# Patient Record
Sex: FEMALE | Race: WHITE | NOT HISPANIC OR LATINO | Employment: OTHER | URBAN - METROPOLITAN AREA
[De-identification: names, ages, dates, MRNs, and addresses within clinical notes are randomized per-mention and may not be internally consistent; named-entity substitution may affect disease eponyms.]

---

## 2017-01-09 RX ORDER — SALSALATE 500 MG/1
TABLET, FILM COATED ORAL
Qty: 60 TABLET | Refills: 0 | Status: SHIPPED | OUTPATIENT
Start: 2017-01-09 | End: 2017-03-01 | Stop reason: SDUPTHER

## 2017-01-16 RX ORDER — METFORMIN HYDROCHLORIDE 1000 MG/1
1000 TABLET ORAL 2 TIMES DAILY WITH MEALS
Qty: 120 TABLET | Refills: 6 | Status: SHIPPED | OUTPATIENT
Start: 2017-01-16 | End: 2018-06-26

## 2017-01-20 RX ORDER — METFORMIN HYDROCHLORIDE 1000 MG/1
1000 TABLET ORAL 2 TIMES DAILY WITH MEALS
Qty: 120 TABLET | Refills: 6 | Status: CANCELLED | OUTPATIENT
Start: 2017-01-20

## 2017-02-02 DIAGNOSIS — E11.40 TYPE 2 DIABETES MELLITUS WITH DIABETIC NEUROPATHY, WITH LONG-TERM CURRENT USE OF INSULIN: ICD-10-CM

## 2017-02-02 DIAGNOSIS — Z79.4 TYPE 2 DIABETES MELLITUS WITH DIABETIC NEUROPATHY, WITH LONG-TERM CURRENT USE OF INSULIN: ICD-10-CM

## 2017-02-02 RX ORDER — INSULIN ASPART 100 [IU]/ML
INJECTION, SUSPENSION SUBCUTANEOUS
Qty: 30 ML | Refills: 0 | Status: SHIPPED | OUTPATIENT
Start: 2017-02-02 | End: 2017-03-09 | Stop reason: SDUPTHER

## 2017-03-01 RX ORDER — SALSALATE 500 MG/1
TABLET, FILM COATED ORAL
Qty: 60 TABLET | Refills: 0 | Status: SHIPPED | OUTPATIENT
Start: 2017-03-01 | End: 2017-04-05 | Stop reason: SDUPTHER

## 2017-03-09 DIAGNOSIS — Z79.4 TYPE 2 DIABETES MELLITUS WITH DIABETIC NEUROPATHY, WITH LONG-TERM CURRENT USE OF INSULIN: ICD-10-CM

## 2017-03-09 DIAGNOSIS — E11.40 TYPE 2 DIABETES MELLITUS WITH DIABETIC NEUROPATHY, WITH LONG-TERM CURRENT USE OF INSULIN: ICD-10-CM

## 2017-03-09 RX ORDER — INSULIN ASPART 100 [IU]/ML
INJECTION, SUSPENSION SUBCUTANEOUS
Qty: 30 ML | Refills: 3 | Status: SHIPPED | OUTPATIENT
Start: 2017-03-09 | End: 2017-12-21 | Stop reason: SDUPTHER

## 2017-03-13 ENCOUNTER — LAB VISIT (OUTPATIENT)
Dept: LAB | Facility: HOSPITAL | Age: 67
End: 2017-03-13
Attending: FAMILY MEDICINE
Payer: MEDICARE

## 2017-03-13 ENCOUNTER — OFFICE VISIT (OUTPATIENT)
Dept: FAMILY MEDICINE | Facility: CLINIC | Age: 67
End: 2017-03-13
Payer: COMMERCIAL

## 2017-03-13 VITALS
HEART RATE: 80 BPM | WEIGHT: 203.06 LBS | SYSTOLIC BLOOD PRESSURE: 114 MMHG | HEIGHT: 68 IN | TEMPERATURE: 98 F | DIASTOLIC BLOOD PRESSURE: 64 MMHG | BODY MASS INDEX: 30.78 KG/M2

## 2017-03-13 DIAGNOSIS — R41.3 MEMORY LOSS: ICD-10-CM

## 2017-03-13 DIAGNOSIS — E11.9 TYPE 2 DIABETES MELLITUS WITHOUT COMPLICATION, WITH LONG-TERM CURRENT USE OF INSULIN: Primary | ICD-10-CM

## 2017-03-13 DIAGNOSIS — Z79.4 TYPE 2 DIABETES MELLITUS WITHOUT COMPLICATION, WITH LONG-TERM CURRENT USE OF INSULIN: Primary | ICD-10-CM

## 2017-03-13 DIAGNOSIS — E11.9 TYPE 2 DIABETES MELLITUS WITHOUT COMPLICATION, WITH LONG-TERM CURRENT USE OF INSULIN: ICD-10-CM

## 2017-03-13 DIAGNOSIS — E11.9 TYPE 2 DIABETES MELLITUS WITHOUT COMPLICATION: ICD-10-CM

## 2017-03-13 DIAGNOSIS — Z79.4 TYPE 2 DIABETES MELLITUS WITHOUT COMPLICATION, WITH LONG-TERM CURRENT USE OF INSULIN: ICD-10-CM

## 2017-03-13 LAB
ALBUMIN SERPL BCP-MCNC: 3.6 G/DL
ALP SERPL-CCNC: 61 U/L
ALT SERPL W/O P-5'-P-CCNC: 7 U/L
ANION GAP SERPL CALC-SCNC: 9 MMOL/L
AST SERPL-CCNC: 14 U/L
BILIRUB SERPL-MCNC: 0.5 MG/DL
BUN SERPL-MCNC: 11 MG/DL
CALCIUM SERPL-MCNC: 9.3 MG/DL
CHLORIDE SERPL-SCNC: 105 MMOL/L
CO2 SERPL-SCNC: 28 MMOL/L
CREAT SERPL-MCNC: 1 MG/DL
EST. GFR  (AFRICAN AMERICAN): >60 ML/MIN/1.73 M^2
EST. GFR  (NON AFRICAN AMERICAN): 58 ML/MIN/1.73 M^2
GLUCOSE SERPL-MCNC: 66 MG/DL
POTASSIUM SERPL-SCNC: 3.9 MMOL/L
PROT SERPL-MCNC: 7 G/DL
SODIUM SERPL-SCNC: 142 MMOL/L
VIT B12 SERPL-MCNC: 277 PG/ML

## 2017-03-13 PROCEDURE — 82607 VITAMIN B-12: CPT

## 2017-03-13 PROCEDURE — 36415 COLL VENOUS BLD VENIPUNCTURE: CPT | Mod: PO

## 2017-03-13 PROCEDURE — 99999 PR PBB SHADOW E&M-EST. PATIENT-LVL III: CPT | Mod: PBBFAC,,, | Performed by: FAMILY MEDICINE

## 2017-03-13 PROCEDURE — 99214 OFFICE O/P EST MOD 30 MIN: CPT | Mod: S$GLB,,, | Performed by: FAMILY MEDICINE

## 2017-03-13 PROCEDURE — 83036 HEMOGLOBIN GLYCOSYLATED A1C: CPT

## 2017-03-13 PROCEDURE — 80053 COMPREHEN METABOLIC PANEL: CPT

## 2017-03-13 RX ORDER — VALACYCLOVIR HYDROCHLORIDE 1 G/1
TABLET, FILM COATED ORAL
Refills: 0 | COMMUNITY
Start: 2017-02-24 | End: 2018-10-30

## 2017-03-13 RX ORDER — TRAMADOL HYDROCHLORIDE 50 MG/1
TABLET ORAL
Refills: 0 | COMMUNITY
Start: 2017-02-24 | End: 2018-10-30

## 2017-03-13 RX ORDER — AMOXICILLIN AND CLAVULANATE POTASSIUM 875; 125 MG/1; MG/1
TABLET, FILM COATED ORAL
Refills: 0 | COMMUNITY
Start: 2017-02-24 | End: 2018-10-30 | Stop reason: ALTCHOICE

## 2017-03-13 RX ORDER — ATENOLOL 100 MG/1
TABLET ORAL
Refills: 4 | COMMUNITY
Start: 2016-12-11 | End: 2017-05-02 | Stop reason: SDUPTHER

## 2017-03-13 NOTE — PROGRESS NOTES
Subjective:       Patient ID: Gris Ken is a 67 y.o. female.    Chief Complaint: Herpes Zoster (follow up)    HPI Comments: Disclaimer: This note has been generated using voice-recognition software. There may be typographical errors that have been missed during proof-reading    Patient is 67-year-old presents with her son today for follow-up.  According to her son, who is present today, the patient was diagnosed as having herpes zoster outbreak involving the left facial area.  The initial diagnosis was done in an urgent care department approximately 10 days ago.  She has now finished a course of Valtrex.  She denies any persistent pain although she has noted some occasional pruritus.  No difficulty swallowing, or mouth pain.  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  She has not been seen for follow-up in over one year.  Her son states that blood glucose monitoring at home has been in the 120s to 150s although she has had several episodes of hypoglycemia with blood sugars in the 50s.  Patient has a pending follow-up appointment with the neurology department for prior history of memory loss and expressive aphasia. She underwent a driving evaluation in August of last year, she is able to drive on a limited basis.  Patient had low vitamin B12 levels in the past, but is presently on no replacement.  Pt underwent Neuropsychological evaluation about one year ago:  RECOMMENDATIONS:     1. More sophisticated neuroimaging, such as a PET scan, may be beneficial in further clarifying the etiology of Ms. Ken decline. 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      2. It is encouraging that there have no observed changes in Ms. Ken daily functioning. However, it will be important to continually monitor her daily functioning as PNFA is a condition that is expected to  progressively worsen over time. In fact, the present findings suggest that she is already experiencing cognitive decline beyond language difficulties. Specifically, testing revealed reduced processing speed, complex/divided attention, and reasoning skills. Based on these findings, it will be very important to monitor her medication compliance and financial management as these are the 2 most complex tasks she is asked to complete on a consistent basis. A higher level of care may be warranted in the future.      3. There are several necessary skills for safe driving, including intact processing speed and complex/divided attention. As noted, Ms. Ken test scores revealed moderate weaknesses in both of these cognitive domains. As a result, it is recommended that she undergo a formal, on-the-road driving evaluation. This type of evaluation can be conducted at the [facility] with [facility]. I will place a referral if Ms. Ken agrees to undergo the evaluation. Once the referral is placed, an appointment can be scheduled with Deanna Quarles at [phone].     4. Ms. Ken may benefit from speech therapy. However, it will be important that the therapist be aware of a condition such as PPA. This is a unique language disorder that progressively worsens over time. As a result, it will be most beneficial for Ms. Ken to work on develop compensatory mechanisms for communication.      5. I agree with Leonor Quach NP that a referral to Dr. Pang for a specialized movement disorder examination may be beneficial considering the suspected etiology of her symptoms.      6. A re-evaluation in 12-18 months is recommended to assess for interval change in order to determine appropriate level of care.     Review of Systems   Endocrine: Negative for polydipsia, polyphagia and polyuria.   Skin: Negative for color change and rash.   Neurological: Negative for facial asymmetry, weakness and numbness.       Objective:       Physical Exam   Constitutional: She appears well-developed and well-nourished. No distress.   Neck: No thyromegaly present.   Cardiovascular: Normal rate and regular rhythm.    No murmur heard.  Pulmonary/Chest: Effort normal and breath sounds normal. She has no wheezes. She has no rales.   Lymphadenopathy:     She has no cervical adenopathy.   Skin: No rash noted.   Left facial exam shows complete resolution of lesions, no vesicles noted       Assessment:         1.  Herpes zoster  2.  Type II DM  3.  Memory loss, progressive aphasia  4.  Vitamin B12 def  Plan:       1.  CMP, A1C, B12 levels  2.  Continue present medications  3.  F/u 3 months   4.  Reassurance re H. Zoster

## 2017-03-13 NOTE — MR AVS SNAPSHOT
[facility]  [address] [facility], [address]  [facility] [address]  Phone: [phone]  Fax: [phone]                  Gris Ken   3/13/2017 2:20 PM   Office Visit    Description:  Female : 1950   Provider:  Israel Welsh MD   Department:  [facility]           Reason for Visit     Herpes Zoster           Diagnoses this Visit        Comments    Type 2 diabetes mellitus without complication, with long-term current use of insulin    -  Primary     Memory loss                To Do List           Goals (5 Years of Data)              3/30/16    2/2/16    1/21/16    HEMOGLOBIN A1C < 7.0   7.1  6.8  6.9      Follow-Up and Disposition     Return in about 3 months (around 2017).      [facility]     [facility] - / Assistance  Registered nurses in the [facility] provide clinical advisement, health education, appointment booking, and other advisory services.  Call for this free service at [phone].             Medications           Message regarding Medications     Verify the changes and/or additions to your medication regime listed below are the same as discussed with your clinician today.  If any of these changes or additions are incorrect, please notify your healthcare provider.             Verify that the below list of medications is an accurate representation of the medications you are currently taking.  If none reported, the list may be blank. If incorrect, please contact your healthcare provider. Carry this list with you in case of emergency.           Current Medications     amoxicillin-clavulanate 875-125mg (AUGMENTIN) 875-125 mg per tablet TK 1 T PO  Q 12 H FOR 10 DAYS    atenolol (TENORMIN) 100 MG tablet     atorvastatin (LIPITOR) 40 MG tablet TAKE 1 TABLET BY MOUTH EVERY EVENING    blood sugar diagnostic (CONTOUR NEXT STRIPS) Strp Monitors blood sugar 4 times daily, before meals and bedtime     "escitalopram oxalate (LEXAPRO) 20 MG tablet One tablet daily    insulin aspart protamine-insulin aspart (NOVOLOG MIX 70-30 FLEXPEN) 100 unit/mL (70-30) InPn pen INJECT 36 UNITS UNDER THE SKIN WITH BREAKFAST AND INJECT 32 UNITS UNDER THE SKIN WITH DINNER    lancets Misc 1 each by Misc.(Non-Drug; Combo Route) route 6 (six) times daily.    memantine (NAMENDA XR) 28 mg CSpX Take 1 capsule by mouth once daily.    metformin (GLUCOPHAGE) 1000 MG tablet Take 1 tablet (1,000 mg total) by mouth 2 (two) times daily with meals.    salsalate (DISALCID) 500 MG Tab TAKE 1 TABLET BY MOUTH TWICE DAILY    tramadol (ULTRAM) 50 mg tablet TK 1 T PO Q 4 H FOR 2 DAYS    valacyclovir (VALTREX) 1000 MG tablet TK 1 T PO Q 8 H FOR 7 DAYS    pen needle, diabetic (SURE-FINE PEN NEEDLES) 31 gauge x 5/16" Ndle 1 each by Misc.(Non-Drug; Combo Route) route 2 (two) times daily.           Clinical Reference Information           Your Vitals Were     BP Pulse Temp Height Weight BMI    114/64 (BP Location: Right arm, Patient Position: Sitting, BP Method: Manual) 80 98.2 °F (36.8 °C) (Oral) 5' 8" (1.727 m) 92.1 kg (203 lb 0.7 oz) 30.87 kg/m2      Blood Pressure          Most Recent Value    BP  114/64      Allergies as of 3/13/2017     Codeine    Percocet [Oxycodone-acetaminophen]    Quinine (Bulk)    Iodinated Contrast Media - Iv Dye    Iodine    Oxycodone    Quinine      Immunizations Administered on Date of Encounter - 3/13/2017     None      Orders Placed During Today's Visit     Future Labs/Procedures Expected by Expires    Comprehensive metabolic panel  3/13/2017 6/11/2017    Hemoglobin A1c  3/13/2017 6/11/2017    Vitamin B12  3/13/2017 6/11/2017      Language Assistance Services     ATTENTION: Language assistance services are available, free of charge. Please call [phone].      ATENCIÓN: Si habla easton, tiene a tan disposición servicios gratuitos de asistencia lingüística. Llame al [phone].     CHÚ Ý: N?u b?n nói Ti?ng Vi?t, có các " d?ch v? h? tr? ngôn ng? mi?n phí dành cho b?n. G?i s? [phone].         [facility] complies with applicable Federal civil rights laws and does not discriminate on the basis of race, color, national origin, age, disability, or sex.

## 2017-03-14 LAB
ESTIMATED AVG GLUCOSE: 131 MG/DL
ESTIMATED AVG GLUCOSE: 131 MG/DL
HBA1C MFR BLD HPLC: 6.2 %
HBA1C MFR BLD HPLC: 6.2 %

## 2017-04-05 RX ORDER — SALSALATE 500 MG/1
TABLET, FILM COATED ORAL
Qty: 60 TABLET | Refills: 0 | Status: SHIPPED | OUTPATIENT
Start: 2017-04-05 | End: 2018-10-30

## 2017-04-07 DIAGNOSIS — E11.9 TYPE 2 DIABETES MELLITUS WITHOUT COMPLICATION: ICD-10-CM

## 2017-04-17 ENCOUNTER — OFFICE VISIT (OUTPATIENT)
Dept: OPTOMETRY | Facility: CLINIC | Age: 67
End: 2017-04-17
Payer: COMMERCIAL

## 2017-04-17 DIAGNOSIS — E10.3293 MILD NONPROLIFERATIVE DIABETIC RETINOPATHY OF BOTH EYES WITHOUT MACULAR EDEMA ASSOCIATED WITH TYPE 1 DIABETES MELLITUS: Primary | ICD-10-CM

## 2017-04-17 DIAGNOSIS — H35.033 HYPERTENSIVE RETINOPATHY OF BOTH EYES: ICD-10-CM

## 2017-04-17 DIAGNOSIS — H43.813 POSTERIOR VITREOUS DETACHMENT, BILATERAL: ICD-10-CM

## 2017-04-17 PROCEDURE — 99999 PR PBB SHADOW E&M-EST. PATIENT-LVL II: CPT | Mod: PBBFAC,,, | Performed by: OPTOMETRIST

## 2017-04-17 PROCEDURE — 92014 COMPRE OPH EXAM EST PT 1/>: CPT | Mod: S$GLB,,, | Performed by: OPTOMETRIST

## 2017-04-17 NOTE — PROGRESS NOTES
HPI     Diabetic eye exam  Some floaters OU, worse in right eye x years  Does not have glasses         Last edited by Ruy May, OD on 4/17/2017 10:23 AM.     ROS     Negative for: Constitutional, Gastrointestinal, Neurological, Skin,   Genitourinary, Musculoskeletal, HENT, Endocrine, Cardiovascular, Eyes,   Respiratory, Psychiatric, Allergic/Imm, Heme/Lymph    Last edited by Ruy May, OD on 4/17/2017  9:43 AM. (History)        Assessment /Plan     For exam results, see Encounter Report.    Mild nonproliferative diabetic retinopathy of both eyes without macular edema associated with type 1 diabetes mellitus    Posterior vitreous detachment, bilateral    Hypertensive retinopathy of both eyes      1. Mild diabetic retinopathy, no csme. Return in 1 year for dilated eye exam.  2. Causing floaters. Monitor condition. Patient to report any changes. RTC 1 year recheck.  3. Mild. Monitor condition. Patient to report any changes. RTC 1 year recheck.

## 2017-05-02 RX ORDER — ATENOLOL 100 MG/1
100 TABLET ORAL DAILY
Qty: 30 TABLET | Refills: 5 | Status: SHIPPED | OUTPATIENT
Start: 2017-05-02 | End: 2018-10-30

## 2017-07-31 DIAGNOSIS — R41.3 MEMORY LOSS: ICD-10-CM

## 2017-07-31 RX ORDER — ESCITALOPRAM OXALATE 20 MG/1
TABLET ORAL
Qty: 90 TABLET | Refills: 1 | Status: SHIPPED | OUTPATIENT
Start: 2017-07-31 | End: 2018-10-30

## 2017-08-21 ENCOUNTER — TELEPHONE (OUTPATIENT)
Dept: FAMILY MEDICINE | Facility: CLINIC | Age: 67
End: 2017-08-21

## 2017-08-21 NOTE — TELEPHONE ENCOUNTER
Please call pt and see if she wants to change beta blocker or try [facility] or smaller pharmacy that has in stock

## 2017-08-21 NOTE — TELEPHONE ENCOUNTER
----- Message from Adriana Gutierrez sent at 8/21/2017  9:01 AM CDT -----  Rickey from [facility] called to advise that the medication atenolol (TENORMIN) 100 MG tablet is on back order, and [facility] will not get it in for at least a month.  Pharmacist wants to know if we can switch to a different beta blocker.  Please call Rickey @ [phone]

## 2017-08-21 NOTE — TELEPHONE ENCOUNTER
Spoke with [facility] on [facility] and they do have Atenolol 100 mg in stock.  Voice msg left for pt regarding back order for this medication at [facility].  Please call our office to advise is a one month refill should be sent to [facility].

## 2017-08-23 ENCOUNTER — PATIENT MESSAGE (OUTPATIENT)
Dept: FAMILY MEDICINE | Facility: CLINIC | Age: 67
End: 2017-08-23

## 2017-12-21 DIAGNOSIS — Z79.4 TYPE 2 DIABETES MELLITUS WITH DIABETIC NEUROPATHY, WITH LONG-TERM CURRENT USE OF INSULIN: ICD-10-CM

## 2017-12-21 DIAGNOSIS — E11.40 TYPE 2 DIABETES MELLITUS WITH DIABETIC NEUROPATHY, WITH LONG-TERM CURRENT USE OF INSULIN: ICD-10-CM

## 2017-12-21 RX ORDER — INSULIN ASPART 100 [IU]/ML
INJECTION, SUSPENSION SUBCUTANEOUS
Qty: 30 ML | Refills: 3 | Status: SHIPPED | OUTPATIENT
Start: 2017-12-21 | End: 2018-10-30

## 2017-12-21 NOTE — TELEPHONE ENCOUNTER
----- Message from Pat Antunez sent at 12/21/2017 12:40 PM CST -----  Contact: pt luis [phone]  RX request - refill or new RX.  Is this a refill or new RX:  refill  RX name and strength:  insulin aspart protamine-insulin aspart (NOVOLOG MIX 70-30 FLEXPEN) 100 unit/mL (70-30) InPn pen   Directions:   Is this a 30 day or 90 day RX:    Pharmacy name and phone # [facility] [id number] [phone]  Comments: need authorization and Pt is out at this time,pt son is concern it has been a few days now

## 2017-12-21 NOTE — TELEPHONE ENCOUNTER
Spoke with patient's son, Dariel, and advised that rx has been electronically sent to [facility].

## 2018-01-03 ENCOUNTER — HOSPITAL ENCOUNTER (EMERGENCY)
Facility: HOSPITAL | Age: 68
Discharge: HOME | End: 2018-01-03
Attending: EMERGENCY MEDICINE
Payer: MEDICARE

## 2018-01-03 VITALS
DIASTOLIC BLOOD PRESSURE: 64 MMHG | SYSTOLIC BLOOD PRESSURE: 115 MMHG | BODY MASS INDEX: 30.87 KG/M2 | WEIGHT: 203 LBS | RESPIRATION RATE: 18 BRPM | OXYGEN SATURATION: 96 % | HEART RATE: 86 BPM | TEMPERATURE: 99 F

## 2018-01-03 DIAGNOSIS — R05.9 COUGH: ICD-10-CM

## 2018-01-03 DIAGNOSIS — R41.82 ALTERED MENTAL STATUS: ICD-10-CM

## 2018-01-03 DIAGNOSIS — E11.9 DM2 (DIABETES MELLITUS, TYPE 2): ICD-10-CM
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
## 2018-01-03 LAB
ALBUMIN SERPL BCP-MCNC: 3 G/DL
ALP SERPL-CCNC: 58 U/L
ALT SERPL W/O P-5'-P-CCNC: 7 U/L
ANION GAP SERPL CALC-SCNC: 8 MMOL/L
AST SERPL-CCNC: 16 U/L
BACTERIA #/AREA URNS AUTO: ABNORMAL /HPF
BASOPHILS # BLD AUTO: 0.02 K/UL
BASOPHILS NFR BLD: 0.5 %
BILIRUB SERPL-MCNC: 0.8 MG/DL
BILIRUB UR QL STRIP: NEGATIVE
BUN SERPL-MCNC: 11 MG/DL
CALCIUM SERPL-MCNC: 8.7 MG/DL
CHLORIDE SERPL-SCNC: 103 MMOL/L
CLARITY UR REFRACT.AUTO: ABNORMAL
CO2 SERPL-SCNC: 24 MMOL/L
COLOR UR AUTO: YELLOW
CREAT SERPL-MCNC: 1.1 MG/DL
DIFFERENTIAL METHOD: ABNORMAL
EOSINOPHIL # BLD AUTO: 0 K/UL
EOSINOPHIL NFR BLD: 0.7 %
ERYTHROCYTE [DISTWIDTH] IN BLOOD BY AUTOMATED COUNT: 12.9 %
EST. GFR  (AFRICAN AMERICAN): 59.6 ML/MIN/1.73 M^2
EST. GFR  (NON AFRICAN AMERICAN): 51.7 ML/MIN/1.73 M^2
GLUCOSE SERPL-MCNC: 100 MG/DL
GLUCOSE UR QL STRIP: ABNORMAL
HCT VFR BLD AUTO: 39.8 %
HGB BLD-MCNC: 13.6 G/DL
HGB UR QL STRIP: ABNORMAL
IMM GRANULOCYTES # BLD AUTO: 0.01 K/UL
IMM GRANULOCYTES NFR BLD AUTO: 0.2 %
KETONES UR QL STRIP: NEGATIVE
LEUKOCYTE ESTERASE UR QL STRIP: ABNORMAL
LYMPHOCYTES # BLD AUTO: 0.7 K/UL
LYMPHOCYTES NFR BLD: 16.2 %
MCH RBC QN AUTO: 28.8 PG
MCHC RBC AUTO-ENTMCNC: 34.2 G/DL
MCV RBC AUTO: 84 FL
MICROSCOPIC COMMENT: ABNORMAL
MONOCYTES # BLD AUTO: 0.5 K/UL
MONOCYTES NFR BLD: 11.7 %
NEUTROPHILS # BLD AUTO: 3 K/UL
NEUTROPHILS NFR BLD: 70.7 %
NITRITE UR QL STRIP: NEGATIVE
NRBC BLD-RTO: 0 /100 WBC
PH UR STRIP: 5 [PH] (ref 5–8)
PLATELET # BLD AUTO: 176 K/UL
PMV BLD AUTO: 9.4 FL
POCT GLUCOSE: 93 MG/DL (ref 70–110)
POTASSIUM SERPL-SCNC: 3.6 MMOL/L
PROT SERPL-MCNC: 6.5 G/DL
PROT UR QL STRIP: NEGATIVE
RBC # BLD AUTO: 4.72 M/UL
RBC #/AREA URNS AUTO: 7 /HPF (ref 0–4)
SODIUM SERPL-SCNC: 135 MMOL/L
SP GR UR STRIP: 1.02 (ref 1–1.03)
SQUAMOUS #/AREA URNS AUTO: 3 /HPF
URN SPEC COLLECT METH UR: ABNORMAL
UROBILINOGEN UR STRIP-ACNC: NEGATIVE EU/DL
WBC # BLD AUTO: 4.19 K/UL
WBC #/AREA URNS AUTO: 13 /HPF (ref 0–5)

## 2018-01-03 PROCEDURE — 82962 GLUCOSE BLOOD TEST: CPT

## 2018-01-03 PROCEDURE — 80053 COMPREHEN METABOLIC PANEL: CPT

## 2018-01-03 PROCEDURE — 99284 EMERGENCY DEPT VISIT MOD MDM: CPT | Mod: ,,, | Performed by: EMERGENCY MEDICINE

## 2018-01-03 PROCEDURE — 85025 COMPLETE CBC W/AUTO DIFF WBC: CPT

## 2018-01-03 PROCEDURE — 99285 EMERGENCY DEPT VISIT HI MDM: CPT | Mod: 25

## 2018-01-03 PROCEDURE — 81001 URINALYSIS AUTO W/SCOPE: CPT

## 2018-01-03 RX ORDER — OSELTAMIVIR PHOSPHATE 75 MG/1
75 CAPSULE ORAL 2 TIMES DAILY
Qty: 10 CAPSULE | Refills: 0 | Status: SHIPPED | OUTPATIENT
Start: 2018-01-03 | End: 2018-01-08

## 2018-01-03 NOTE — ED TRIAGE NOTES
Gris Ken, a 67 y.o. female presents to the ED with altered mental status. Son reports that pt fell several times today and had a hypoglycemic episode yesterday. Son reports pt is usually Aox3 and is now Aox1. Denies and LOC with falls.     Chief Complaint   Patient presents with    Altered Mental Status     Hx of dementia. AOx1, usual AOx3     Review of patient's allergies indicates:   Allergen Reactions    Codeine      Other reaction(s): Vomiting,Tachyc  vomitting and tachycardia    Percocet [oxycodone-acetaminophen]      Other reaction(s): Hallucinations  hallucinations    Quinine (bulk)     Iodinated contrast- oral and iv dye Swelling     Other reaction(s): Hypotension    Iodine      Other reaction(s): Swelling,Hypote    Oxycodone      Other reaction(s): Hallucinations    Quinine      Other reaction(s): Muscle pain     Past Medical History:   Diagnosis Date    Anxiety     Diabetes mellitus     Diabetes mellitus type II     Diabetic retinopathy     DJD (degenerative joint disease)     Fatty liver     Glaucoma suspect with open angle     Hyperlipidemia     Hypertension

## 2018-01-03 NOTE — ED PROVIDER NOTES
Encounter Date: 1/3/2018    SCRIBE #1 NOTE: I, Kasey Doty, am scribing for, and in the presence of,  Dr. Restrepo. I have scribed the following portions of the note - Other sections scribed: HPI,ROS,Physical Exam.       History     Chief Complaint   Patient presents with    Altered Mental Status     Hx of dementia. AOx1, usual AOx3     Time patient was seen by the provider: 4:50 AM      The patient is a 67 y.o. female with hx of: HTN, HLD, DM II and pre-frontal lobe dementia who presents to the ED with a complaint of AMS. Pt is s/p two falls this week. During the first fall pt fell backwards but did not hit her head. Pt's son believes this fall to be due to a hypoglycemic attack in which her blood sugar was around 30 because pt was administering too much of her novolog due to her dementia. The second fall was witnessed and pt got up out of bed too fast and slipped but caught herself and did not hit her head. Today pt is exhibiting a normal blood sugar around the 130's-170's but has increased confusion and was not able to give her name. Now in ED she is able to answer questions like what's her name and where she is. Pt's son also endorses cough and runny nose. The pt's  is also experiencing cold-like symptoms. Yesterday the pt was at her baseline and able to hold a conversation although she did not eat anything. Last shot of insulin was at 7:00 PM last night.      The history is provided by a relative and medical records.     Review of patient's allergies indicates:   Allergen Reactions    Codeine      Other reaction(s): Vomiting,Tachyc  vomitting and tachycardia    Percocet [oxycodone-acetaminophen]      Other reaction(s): Hallucinations  hallucinations    Quinine (bulk)     Iodinated contrast- oral and iv dye Swelling     Other reaction(s): Hypotension    Iodine      Other reaction(s): Swelling,Hypote    Oxycodone      Other reaction(s): Hallucinations    Quinine      Other reaction(s): Muscle pain      Past Medical History:   Diagnosis Date    Anxiety     Diabetes mellitus     Diabetes mellitus type II     Diabetic retinopathy     DJD (degenerative joint disease)     Fatty liver     Glaucoma suspect with open angle     Hyperlipidemia     Hypertension      Past Surgical History:   Procedure Laterality Date    CATARACT EXTRACTION      CATARACT EXTRACTION BILATERAL W/ ANTERIOR VITRECTOMY      CHOLECYSTECTOMY      COLONOSCOPY  2010     Family History   Problem Relation Age of Onset    Cancer Mother      colon    Diabetes Father     Diabetes Sister     Cancer Brother      esophagus     Social History   Substance Use Topics    Smoking status: Former Smoker     Types: Cigarettes    Smokeless tobacco: Never Used    Alcohol use No     Review of Systems   Constitutional: Negative for chills and fever.   HENT: Positive for rhinorrhea.    Eyes: Negative for pain.   Respiratory: Positive for cough.    Cardiovascular: Negative for chest pain.   Gastrointestinal: Negative for nausea and vomiting.   Genitourinary: Negative for difficulty urinating.   Musculoskeletal: Negative for myalgias.   Skin: Negative for rash.   Neurological: Negative for headaches.       Physical Exam     Initial Vitals [01/03/18 0318]   BP Pulse Resp Temp SpO2   (!) 104/58 98 18 -- 99 %      MAP       73.33         Physical Exam    Vitals reviewed.  Constitutional: She appears well-developed and well-nourished. No distress.   HENT:   Head: Normocephalic and atraumatic.   Mouth/Throat: Oropharynx is clear and moist.   Eyes: Pupils are equal, round, and reactive to light. No scleral icterus.   Neck: Normal range of motion. Neck supple.   Cardiovascular: Normal rate, regular rhythm and normal heart sounds. Exam reveals no gallop and no friction rub.    No murmur heard.  No brisk capillary refill   Pulmonary/Chest: Breath sounds normal. No respiratory distress. She has no wheezes. She has no rhonchi. She has no rales.   Abdominal:  Soft. Bowel sounds are normal. She exhibits no distension and no mass. There is no tenderness.   Musculoskeletal:   Warm, well perfused, no clubbing, cyanosis or edema. Muscles of normal tone.    Neurological: She is alert and oriented to person, place, and time.   Confused and inappropriate speech. She is alert and oriented to name and place but with some inappropriate word usage. Equal  strength with push and pull. No facial droop. No leg drift.   Skin: Skin is warm and dry. No rash noted.         ED Course   Procedures  Labs Reviewed   URINALYSIS   CBC W/ AUTO DIFFERENTIAL   COMPREHENSIVE METABOLIC PANEL   POCT GLUCOSE             Medical Decision Making:   History:   Old Medical Records: I decided to obtain old medical records.  Clinical Tests:   Lab Tests: Ordered and Reviewed  Radiological Study: Ordered and Reviewed      patient here with worsening mental status over the last several days, additional confusion.  Given family history of recent viral illness, I think this is most likely the cause of her cognitive decline, chest x-ray shows no pneumonia, CT head is unremarkable.  No leukocytosis and labs are otherwise within normal limits, awaiting urinalysis.  If this is negative, will discharge patient with Tamiflu as she would likely benefit given her age and risk factors, and advised follow-up with primary doctor.  Patient's son is amenable to this plan.     Update 0700: UA negative, will d/c home with tamiflu.        Scribe Attestation:   Scribe #1: I performed the above scribed service and the documentation accurately describes the services I performed. I attest to the accuracy of the note.            ED Course      Clinical Impression:   Diagnoses of Altered mental status and Cough were pertinent to this visit.                           Anselmo Restrepo MD  01/03/18 0705       Anselmo Restrepo MD  01/03/18 0712

## 2018-01-03 NOTE — ED NOTES
Adult Physical Assessment  LOC: Gris Ken, 67 y.o. female verified via two identifiers.  The patient is awake, alert, oriented and speaking appropriately at this time.  APPEARANCE: Patient resting comfortably and appears to be in no acute distress at this time. Patient is clean and well groomed, patient's clothing is properly fastened.  SKIN:The skin is warm and dry, color consistent with ethnicity, patient has normal skin turgor and moist mucus membranes, skin intact, no breakdown or brusing noted.  MUSCULOSKELETAL: Patient moving all extremities well, no obvious swelling or deformities noted.  RESPIRATORY: Airway is open and patent, respirations are spontaneous, patient has a normal effort and rate, no accessory muscle use noted. Non productive cough  CARDIAC: Patient has a normal rate and rhythm, no periphreal edema noted in any extremity, capillary refill < 3 seconds in all extremities  ABDOMEN: Soft and non tender to palpation, no abdominal distention noted. Bowel sounds present in all four quadrants.  NEUROLOGIC: Eyes open spontaneously, follows commands, facial expression symmetrical, bilateral hand grasp equal and even, purposeful motor response noted, normal sensation in all extremities when touched with a finger. No slurred speech. Inappropriate responses to questions. A&Ox1

## 2018-01-03 NOTE — ED NOTES
Pt awake and alert, answers to questions appropriate for pt's dementia status per pt's son.  Pt denies pain.  Pt and son updated on status and v/u.  Siderails up x2/call light in reach.

## 2018-06-06 ENCOUNTER — LAB VISIT (OUTPATIENT)
Dept: LAB | Facility: HOSPITAL | Age: 68
End: 2018-06-06
Attending: FAMILY MEDICINE
Payer: COMMERCIAL

## 2018-06-06 ENCOUNTER — OFFICE VISIT (OUTPATIENT)
Dept: FAMILY MEDICINE | Facility: CLINIC | Age: 68
End: 2018-06-06
Payer: COMMERCIAL

## 2018-06-06 VITALS
TEMPERATURE: 98 F | WEIGHT: 164.69 LBS | SYSTOLIC BLOOD PRESSURE: 120 MMHG | BODY MASS INDEX: 24.96 KG/M2 | HEIGHT: 68 IN | DIASTOLIC BLOOD PRESSURE: 64 MMHG | HEART RATE: 67 BPM

## 2018-06-06 DIAGNOSIS — Z00.00 ROUTINE GENERAL MEDICAL EXAMINATION AT A HEALTH CARE FACILITY: ICD-10-CM

## 2018-06-06 DIAGNOSIS — Z00.00 ROUTINE GENERAL MEDICAL EXAMINATION AT A HEALTH CARE FACILITY: Primary | ICD-10-CM

## 2018-06-06 DIAGNOSIS — Z79.4 TYPE 2 DIABETES MELLITUS WITH OTHER NEUROLOGIC COMPLICATION, WITH LONG-TERM CURRENT USE OF INSULIN: ICD-10-CM

## 2018-06-06 DIAGNOSIS — E78.5 HYPERLIPIDEMIA, UNSPECIFIED HYPERLIPIDEMIA TYPE: ICD-10-CM

## 2018-06-06 DIAGNOSIS — G47.00 INSOMNIA, UNSPECIFIED TYPE: ICD-10-CM

## 2018-06-06 DIAGNOSIS — M71.332 SYNOVIAL CYST OF WRIST, LEFT: ICD-10-CM

## 2018-06-06 DIAGNOSIS — E11.49 TYPE 2 DIABETES MELLITUS WITH OTHER NEUROLOGIC COMPLICATION, WITH LONG-TERM CURRENT USE OF INSULIN: ICD-10-CM

## 2018-06-06 LAB
ALBUMIN SERPL BCP-MCNC: 3.9 G/DL
ALP SERPL-CCNC: 75 U/L
ALT SERPL W/O P-5'-P-CCNC: 17 U/L
ANION GAP SERPL CALC-SCNC: 10 MMOL/L
AST SERPL-CCNC: 19 U/L
BASOPHILS # BLD AUTO: 0.03 K/UL
BASOPHILS NFR BLD: 0.7 %
BILIRUB SERPL-MCNC: 0.8 MG/DL
BUN SERPL-MCNC: 8 MG/DL
CALCIUM SERPL-MCNC: 9.7 MG/DL
CHLORIDE SERPL-SCNC: 101 MMOL/L
CHOLEST SERPL-MCNC: 235 MG/DL
CHOLEST/HDLC SERPL: 4.1 {RATIO}
CO2 SERPL-SCNC: 27 MMOL/L
CREAT SERPL-MCNC: 1.3 MG/DL
DIFFERENTIAL METHOD: NORMAL
EOSINOPHIL # BLD AUTO: 0.1 K/UL
EOSINOPHIL NFR BLD: 2 %
ERYTHROCYTE [DISTWIDTH] IN BLOOD BY AUTOMATED COUNT: 12.5 %
EST. GFR  (AFRICAN AMERICAN): 48.4 ML/MIN/1.73 M^2
EST. GFR  (NON AFRICAN AMERICAN): 42 ML/MIN/1.73 M^2
ESTIMATED AVG GLUCOSE: 260 MG/DL
GLUCOSE SERPL-MCNC: 362 MG/DL
HBA1C MFR BLD HPLC: 10.7 %
HCT VFR BLD AUTO: 44.9 %
HDLC SERPL-MCNC: 58 MG/DL
HDLC SERPL: 24.7 %
HGB BLD-MCNC: 15.2 G/DL
IMM GRANULOCYTES # BLD AUTO: 0.01 K/UL
IMM GRANULOCYTES NFR BLD AUTO: 0.2 %
LDLC SERPL CALC-MCNC: 158.4 MG/DL
LYMPHOCYTES # BLD AUTO: 1.4 K/UL
LYMPHOCYTES NFR BLD: 32.1 %
MCH RBC QN AUTO: 28.9 PG
MCHC RBC AUTO-ENTMCNC: 33.9 G/DL
MCV RBC AUTO: 85 FL
MONOCYTES # BLD AUTO: 0.3 K/UL
MONOCYTES NFR BLD: 7.7 %
NEUTROPHILS # BLD AUTO: 2.5 K/UL
NEUTROPHILS NFR BLD: 57.3 %
NONHDLC SERPL-MCNC: 177 MG/DL
NRBC BLD-RTO: 0 /100 WBC
PLATELET # BLD AUTO: 270 K/UL
PMV BLD AUTO: 10.8 FL
POTASSIUM SERPL-SCNC: 4.2 MMOL/L
PROT SERPL-MCNC: 6.9 G/DL
RBC # BLD AUTO: 5.26 M/UL
SODIUM SERPL-SCNC: 138 MMOL/L
TRIGL SERPL-MCNC: 93 MG/DL
TSH SERPL DL<=0.005 MIU/L-ACNC: 1.41 UIU/ML
WBC # BLD AUTO: 4.42 K/UL

## 2018-06-06 PROCEDURE — 85025 COMPLETE CBC W/AUTO DIFF WBC: CPT

## 2018-06-06 PROCEDURE — 80053 COMPREHEN METABOLIC PANEL: CPT

## 2018-06-06 PROCEDURE — 83036 HEMOGLOBIN GLYCOSYLATED A1C: CPT

## 2018-06-06 PROCEDURE — 99999 PR PBB SHADOW E&M-EST. PATIENT-LVL IV: CPT | Mod: PBBFAC,,, | Performed by: FAMILY MEDICINE

## 2018-06-06 PROCEDURE — 36415 COLL VENOUS BLD VENIPUNCTURE: CPT | Mod: PO

## 2018-06-06 PROCEDURE — 80061 LIPID PANEL: CPT

## 2018-06-06 PROCEDURE — 84443 ASSAY THYROID STIM HORMONE: CPT

## 2018-06-06 PROCEDURE — 99214 OFFICE O/P EST MOD 30 MIN: CPT | Mod: PBBFAC,PO | Performed by: FAMILY MEDICINE

## 2018-06-06 PROCEDURE — 99213 OFFICE O/P EST LOW 20 MIN: CPT | Mod: S$GLB,,, | Performed by: FAMILY MEDICINE

## 2018-06-06 RX ORDER — TRAZODONE HYDROCHLORIDE 50 MG/1
50 TABLET ORAL NIGHTLY
Qty: 30 TABLET | Refills: 11 | Status: SHIPPED | OUTPATIENT
Start: 2018-06-06 | End: 2018-06-26

## 2018-06-26 ENCOUNTER — TELEPHONE (OUTPATIENT)
Dept: FAMILY MEDICINE | Facility: CLINIC | Age: 68
End: 2018-06-26

## 2018-06-26 ENCOUNTER — OFFICE VISIT (OUTPATIENT)
Dept: FAMILY MEDICINE | Facility: CLINIC | Age: 68
End: 2018-06-26
Payer: COMMERCIAL

## 2018-06-26 VITALS
DIASTOLIC BLOOD PRESSURE: 70 MMHG | BODY MASS INDEX: 23.95 KG/M2 | TEMPERATURE: 99 F | SYSTOLIC BLOOD PRESSURE: 118 MMHG | HEART RATE: 82 BPM | HEIGHT: 68 IN | WEIGHT: 158.06 LBS

## 2018-06-26 DIAGNOSIS — E78.5 HYPERLIPIDEMIA, UNSPECIFIED HYPERLIPIDEMIA TYPE: ICD-10-CM

## 2018-06-26 DIAGNOSIS — R47.01 APHASIA: ICD-10-CM

## 2018-06-26 DIAGNOSIS — F32.A DEPRESSION, UNSPECIFIED DEPRESSION TYPE: ICD-10-CM

## 2018-06-26 PROCEDURE — 99214 OFFICE O/P EST MOD 30 MIN: CPT | Mod: S$GLB,,, | Performed by: FAMILY MEDICINE

## 2018-06-26 PROCEDURE — 99999 PR PBB SHADOW E&M-EST. PATIENT-LVL III: CPT | Mod: PBBFAC,,, | Performed by: FAMILY MEDICINE

## 2018-06-26 PROCEDURE — 99213 OFFICE O/P EST LOW 20 MIN: CPT | Mod: PBBFAC,PO | Performed by: FAMILY MEDICINE

## 2018-06-26 RX ORDER — MIRTAZAPINE 7.5 MG/1
7.5 TABLET, FILM COATED ORAL NIGHTLY
Qty: 30 TABLET | Refills: 11 | Status: SHIPPED | OUTPATIENT
Start: 2018-06-26 | End: 2018-08-09 | Stop reason: SDUPTHER

## 2018-06-26 RX ORDER — METFORMIN HYDROCHLORIDE 500 MG/1
500 TABLET, EXTENDED RELEASE ORAL 2 TIMES DAILY WITH MEALS
Qty: 180 TABLET | Refills: 3 | Status: SHIPPED | OUTPATIENT
Start: 2018-06-26 | End: 2019-06-26

## 2018-06-26 RX ORDER — INSULIN GLARGINE 100 [IU]/ML
10 INJECTION, SOLUTION SUBCUTANEOUS DAILY
Qty: 15 ML | Refills: 6 | Status: SHIPPED | OUTPATIENT
Start: 2018-06-26 | End: 2019-06-26

## 2018-06-26 NOTE — PROGRESS NOTES
Subjective:       Patient ID: Gris Ken is a 68 y.o. female.    Chief Complaint: Medication Problem (follow up)    Disclaimer: This note has been generated using voice-recognition software. There may be typographical errors that have been missed during proof-reading    67 yo presents with son today for follow up and review of lab results.  At her last visit, son informed us that pt was off all medications.  Pt unable to check BGs, son states he can check them for her (he lives at home with parents).  Last A1C 10.7.  Pt was having frequent hypoglycemic episodes on prior dose of insulin  She continues with Aphasia, which seems to be worsening, along with memory loss.  Pt had partial workup of aphasia, but never followed with Neurology Consult.  Family has now stopped Namenda without any significant changes noted      Review of Systems   Constitutional: Positive for appetite change and unexpected weight change.   Respiratory: Negative for chest tightness and shortness of breath.    Cardiovascular: Negative for chest pain.   Gastrointestinal: Negative for abdominal distention and abdominal pain.   Endocrine: Negative for polydipsia, polyphagia and polyuria.   Neurological: Positive for speech difficulty. Negative for dizziness and light-headedness.   Hematological:        Insomnia, long term memory loss.     Psychiatric/Behavioral: Positive for sleep disturbance. The patient is hyperactive.        Objective:      Physical Exam   Constitutional: She appears well-developed and well-nourished.   Thin, unable to communicate   Cardiovascular: Normal rate.  Exam reveals no friction rub.    No murmur heard.  Pulmonary/Chest: Effort normal and breath sounds normal. She has no rales.   Psychiatric: Her affect is labile. She is withdrawn. She exhibits abnormal recent memory and abnormal remote memory.       Assessment:       1.  Type ii diabetes, uncontrolled  2.  Aphasia  3.  Insomnia  4.  Weight loss  Plan:       1.   Restart Metformin at 1Gm daily (500mg bid)  2.  Lantus, 10units daily  3.  Check BGs bid with follow up by phone in one week  4.  Remeron 7.5mg qhs to help with insomnia, loss of appetite, with gradual increase as needed  5.  F/u consult with Neurology

## 2018-06-29 DIAGNOSIS — Z12.39 BREAST CANCER SCREENING: ICD-10-CM

## 2018-06-29 DIAGNOSIS — E11.9 TYPE 2 DIABETES MELLITUS WITHOUT COMPLICATION: ICD-10-CM

## 2018-07-07 ENCOUNTER — PATIENT MESSAGE (OUTPATIENT)
Dept: FAMILY MEDICINE | Facility: CLINIC | Age: 68
End: 2018-07-07

## 2018-07-17 ENCOUNTER — PATIENT MESSAGE (OUTPATIENT)
Dept: FAMILY MEDICINE | Facility: CLINIC | Age: 68
End: 2018-07-17

## 2018-08-09 ENCOUNTER — PATIENT MESSAGE (OUTPATIENT)
Dept: FAMILY MEDICINE | Facility: CLINIC | Age: 68
End: 2018-08-09

## 2018-08-09 DIAGNOSIS — F32.A DEPRESSION, UNSPECIFIED DEPRESSION TYPE: ICD-10-CM

## 2018-08-09 RX ORDER — MIRTAZAPINE 30 MG/1
30 TABLET, FILM COATED ORAL NIGHTLY
Qty: 30 TABLET | Refills: 11 | Status: SHIPPED | OUTPATIENT
Start: 2018-08-09 | End: 2019-08-09

## 2018-08-17 ENCOUNTER — HOSPITAL ENCOUNTER (EMERGENCY)
Facility: HOSPITAL | Age: 68
Discharge: HOME | End: 2018-08-17
Attending: EMERGENCY MEDICINE
Payer: COMMERCIAL

## 2018-08-17 VITALS
DIASTOLIC BLOOD PRESSURE: 82 MMHG | RESPIRATION RATE: 16 BRPM | BODY MASS INDEX: 24.99 KG/M2 | SYSTOLIC BLOOD PRESSURE: 159 MMHG | HEART RATE: 87 BPM | TEMPERATURE: 99 F | HEIGHT: 65 IN | WEIGHT: 150 LBS | OXYGEN SATURATION: 97 %

## 2018-08-17 DIAGNOSIS — R41.82 ALTERED MENTAL STATUS: Primary | ICD-10-CM

## 2018-08-17 DIAGNOSIS — R55 SYNCOPE, UNSPECIFIED SYNCOPE TYPE: ICD-10-CM

## 2018-08-17 LAB
ALBUMIN SERPL BCP-MCNC: 3.4 G/DL
ALP SERPL-CCNC: 81 U/L
ALT SERPL W/O P-5'-P-CCNC: 7 U/L
AMPHET+METHAMPHET UR QL: NEGATIVE
ANION GAP SERPL CALC-SCNC: 10 MMOL/L
AST SERPL-CCNC: 12 U/L
BARBITURATES UR QL SCN>200 NG/ML: NEGATIVE
BASOPHILS # BLD AUTO: 0.05 K/UL
BASOPHILS NFR BLD: 1 %
BENZODIAZ UR QL SCN>200 NG/ML: NEGATIVE
BILIRUB SERPL-MCNC: 0.8 MG/DL
BILIRUB UR QL STRIP: NEGATIVE
BUN SERPL-MCNC: 10 MG/DL
BZE UR QL SCN: NEGATIVE
CALCIUM SERPL-MCNC: 9.2 MG/DL
CANNABINOIDS UR QL SCN: NEGATIVE
CHLORIDE SERPL-SCNC: 107 MMOL/L
CLARITY UR REFRACT.AUTO: CLEAR
CO2 SERPL-SCNC: 25 MMOL/L
COLOR UR AUTO: YELLOW
CREAT SERPL-MCNC: 1 MG/DL
CREAT UR-MCNC: 81 MG/DL
DIFFERENTIAL METHOD: NORMAL
EOSINOPHIL # BLD AUTO: 0.2 K/UL
EOSINOPHIL NFR BLD: 4.2 %
ERYTHROCYTE [DISTWIDTH] IN BLOOD BY AUTOMATED COUNT: 12.8 %
EST. GFR  (AFRICAN AMERICAN): >60 ML/MIN/1.73 M^2
EST. GFR  (NON AFRICAN AMERICAN): 57.6 ML/MIN/1.73 M^2
GLUCOSE SERPL-MCNC: 197 MG/DL
GLUCOSE UR QL STRIP: ABNORMAL
HCT VFR BLD AUTO: 43.3 %
HGB BLD-MCNC: 14.7 G/DL
HGB UR QL STRIP: ABNORMAL
IMM GRANULOCYTES # BLD AUTO: 0.02 K/UL
IMM GRANULOCYTES NFR BLD AUTO: 0.4 %
KETONES UR QL STRIP: NEGATIVE
LEUKOCYTE ESTERASE UR QL STRIP: NEGATIVE
LYMPHOCYTES # BLD AUTO: 2 K/UL
LYMPHOCYTES NFR BLD: 41.8 %
MCH RBC QN AUTO: 29.3 PG
MCHC RBC AUTO-ENTMCNC: 33.9 G/DL
MCV RBC AUTO: 86 FL
METHADONE UR QL SCN>300 NG/ML: NEGATIVE
MICROSCOPIC COMMENT: NORMAL
MONOCYTES # BLD AUTO: 0.3 K/UL
MONOCYTES NFR BLD: 7.1 %
NEUTROPHILS # BLD AUTO: 2.2 K/UL
NEUTROPHILS NFR BLD: 45.5 %
NITRITE UR QL STRIP: NEGATIVE
NRBC BLD-RTO: 0 /100 WBC
OPIATES UR QL SCN: NEGATIVE
PCP UR QL SCN>25 NG/ML: NEGATIVE
PH UR STRIP: 6 [PH] (ref 5–8)
PLATELET # BLD AUTO: 208 K/UL
PMV BLD AUTO: 10.4 FL
POTASSIUM SERPL-SCNC: 3.9 MMOL/L
PROT SERPL-MCNC: 6.4 G/DL
PROT UR QL STRIP: NEGATIVE
RBC # BLD AUTO: 5.01 M/UL
RBC #/AREA URNS AUTO: 1 /HPF (ref 0–4)
SODIUM SERPL-SCNC: 142 MMOL/L
SP GR UR STRIP: 1.01 (ref 1–1.03)
SQUAMOUS #/AREA URNS AUTO: 1 /HPF
TOXICOLOGY INFORMATION: NORMAL
TROPONIN I SERPL DL<=0.01 NG/ML-MCNC: <0.006 NG/ML
TSH SERPL DL<=0.005 MIU/L-ACNC: 2.23 UIU/ML
URN SPEC COLLECT METH UR: ABNORMAL
UROBILINOGEN UR STRIP-ACNC: NEGATIVE EU/DL
WBC # BLD AUTO: 4.78 K/UL
WBC #/AREA URNS AUTO: 2 /HPF (ref 0–5)

## 2018-08-17 PROCEDURE — 81001 URINALYSIS AUTO W/SCOPE: CPT | Mod: 59

## 2018-08-17 PROCEDURE — 85025 COMPLETE CBC W/AUTO DIFF WBC: CPT

## 2018-08-17 PROCEDURE — 80307 DRUG TEST PRSMV CHEM ANLYZR: CPT

## 2018-08-17 PROCEDURE — 93010 ELECTROCARDIOGRAM REPORT: CPT | Mod: ,,, | Performed by: INTERNAL MEDICINE

## 2018-08-17 PROCEDURE — 84443 ASSAY THYROID STIM HORMONE: CPT

## 2018-08-17 PROCEDURE — 99285 EMERGENCY DEPT VISIT HI MDM: CPT | Mod: ,,, | Performed by: PHYSICIAN ASSISTANT

## 2018-08-17 PROCEDURE — 99285 EMERGENCY DEPT VISIT HI MDM: CPT | Mod: 25

## 2018-08-17 PROCEDURE — 96374 THER/PROPH/DIAG INJ IV PUSH: CPT

## 2018-08-17 PROCEDURE — 80053 COMPREHEN METABOLIC PANEL: CPT

## 2018-08-17 PROCEDURE — 84484 ASSAY OF TROPONIN QUANT: CPT

## 2018-08-17 PROCEDURE — 51701 INSERT BLADDER CATHETER: CPT

## 2018-08-17 PROCEDURE — 63600175 PHARM REV CODE 636 W HCPCS: Performed by: PHYSICIAN ASSISTANT

## 2018-08-17 RX ORDER — LORAZEPAM 2 MG/ML
0.5 INJECTION INTRAMUSCULAR
Status: COMPLETED | OUTPATIENT
Start: 2018-08-17 | End: 2018-08-17

## 2018-08-17 RX ORDER — LORAZEPAM 2 MG/ML
0.5 INJECTION INTRAMUSCULAR
Status: DISCONTINUED | OUTPATIENT
Start: 2018-08-17 | End: 2018-08-17 | Stop reason: HOSPADM

## 2018-08-17 RX ADMIN — LORAZEPAM 0.5 MG: 2 INJECTION INTRAMUSCULAR; INTRAVENOUS at 09:08

## 2018-08-17 NOTE — ED PROVIDER NOTES
Encounter Date: 8/17/2018       History     Chief Complaint   Patient presents with    Altered Mental Status     found by son, given narcan and became a little reposonsive, hx of dementia     This is a 68-year-old female with a past medical history of hypertension, hyperlipidemia, diabetes, dementia who presents to the ED with a chief complaint of altered mental status.  Patient is present with her son and home nurse.  She has reportedly had a decline in her mental status over the last few weeks.  Early this morning around 815 her overnight sitter reported that she let out a loud moan followed by a syncopal episode, where she was guided to the floor.  He describes that her eyes were rolled back and she was observed to not be breathing.  A few chest compressions were delivered by the family.  EMS was activated - on arrival glucose was 173 and she was noted to have pinpoint pupils, responsive to Narcan x 1 in the field.  Patient became increasingly agitated en route, although this is not changed from baseline.  Her son reports that in the last week she has had an increase in her Remeron and insulin dosing.  She has irregular eating patterns and poor sleep. She is prescribed Tramadol but has not been given this recently.          Review of patient's allergies indicates:   Allergen Reactions    Codeine      Other reaction(s): Vomiting,Tachyc  vomitting and tachycardia    Percocet [oxycodone-acetaminophen]      Other reaction(s): Hallucinations  hallucinations    Quinine (bulk)     Iodinated contrast- oral and iv dye Swelling     Other reaction(s): Hypotension    Iodine      Other reaction(s): Swelling,Hypote    Oxycodone      Other reaction(s): Hallucinations    Quinine      Other reaction(s): Muscle pain     Past Medical History:   Diagnosis Date    Anxiety     Diabetes mellitus     Diabetes mellitus type II     Diabetic retinopathy     DJD (degenerative joint disease)     Fatty liver     Glaucoma  "suspect with open angle     Hyperlipidemia     Hypertension      Past Surgical History:   Procedure Laterality Date    CATARACT EXTRACTION      CATARACT EXTRACTION BILATERAL W/ ANTERIOR VITRECTOMY      CHOLECYSTECTOMY      COLONOSCOPY  2010     Family History   Problem Relation Age of Onset    Cancer Mother         colon    Diabetes Father     Diabetes Sister     Cancer Brother         esophagus     Social History     Tobacco Use    Smoking status: Former Smoker     Types: Cigarettes    Smokeless tobacco: Never Used   Substance Use Topics    Alcohol use: No     Alcohol/week: 0.0 oz    Drug use: No     Review of Systems   Unable to perform ROS: Mental status change   Neurological: Positive for syncope.       Physical Exam     Initial Vitals [08/17/18 0848]   BP Pulse Resp Temp SpO2   (!) 158/72 82 16 98.8 °F (37.1 °C) 98 %      MAP       --         Physical Exam    Constitutional: She appears well-developed and well-nourished. She appears distressed.   HENT:   Head: Atraumatic.   Eyes: Conjunctivae and EOM are normal. Pupils are equal, round, and reactive to light.   Cardiovascular: Normal rate, regular rhythm and normal heart sounds.   Pulmonary/Chest: Breath sounds normal. No respiratory distress. She has no wheezes. She has no rhonchi. She has no rales.   Abdominal: Soft. Bowel sounds are normal. There is no tenderness.   Neurological: She is alert. She has normal strength. She is disoriented.   Repetitive speech "yvette yvette yvette"   Skin: Skin is warm and dry. No rash noted.   Psychiatric: She is agitated and combative.   Patient extremely agitated, attempting to get out of stretcher despite 2 point soft restraints. Pt bit son while in exam room. No broken skin.         ED Course   Procedures  Labs Reviewed   COMPREHENSIVE METABOLIC PANEL - Abnormal; Notable for the following components:       Result Value    Glucose 197 (*)     Albumin 3.4 (*)     ALT 7 (*)     eGFR if non  57.6 (*)  "    All other components within normal limits   URINALYSIS, REFLEX TO URINE CULTURE - Abnormal; Notable for the following components:    Glucose, UA 2+ (*)     Occult Blood UA 1+ (*)     All other components within normal limits   CBC W/ AUTO DIFFERENTIAL   DRUG SCREEN PANEL, URINE EMERGENCY   TSH   TROPONIN I   URINALYSIS MICROSCOPIC   POCT GLUCOSE MONITORING CONTINUOUS          Imaging Results          CT Head Without Contrast (Final result)  Result time 08/17/18 11:02:28    Final result by Rodri Ro DO (08/17/18 11:02:28)                 Impression:      No acute intracranial findings.    Age-appropriate cerebral volume loss with mild patchy decreased attenuation supratentorial white matter while nonspecific suggestive for chronic ischemic change.    No evidence for acute intracranial hemorrhage.  Clinical correlation and further evaluation as warranted.      Electronically signed by: Rodri Ro DO  Date:    08/17/2018  Time:    11:02             Narrative:    EXAMINATION:  CT HEAD WITHOUT CONTRAST    CLINICAL HISTORY:  Confusion/delirium, altered LOC, unexplained;    TECHNIQUE:  Multiple sequential 5 mm axial images of the head without contrast.  Coronal and sagittal reformatted imaging from the axial acquisition.    COMPARISON:  1/3/18    FINDINGS:  There is age-appropriate generalized cerebral volume loss.  Compensatory enlargement of the ventricle sulci and cisterns without hydrocephalus.  There is no midline shift or mass effect.  There is mild ill-defined decreased attenuation in the supratentorial white matter while nonspecific suggestive for chronic ischemic change.  There is no evidence for acute intracranial hemorrhage or sulcal effacement.  Study is slightly limited by patient motion.  There is no midline shift or mass effect.  Mild left maxillary lobular mucosal thickening or small mucous retention cyst.  Remaining visualized paranasal sinuses and mastoid air cells are clear..                  "              X-Ray Chest AP Portable (Final result)  Result time 08/17/18 09:39:38    Final result by Ar Gan MD (08/17/18 09:39:38)                 Impression:      No acute finding or detrimental change when compared with 01/03/2018.      Electronically signed by: Ar Gan MD  Date:    08/17/2018  Time:    09:39             Narrative:    EXAMINATION:  XR CHEST AP PORTABLE    CLINICAL HISTORY:  Provided history is "  Altered mental status, unspecified".    TECHNIQUE:  One view of the chest.    COMPARISON:  01/03/2018.    FINDINGS:  Cardiac wires overlie the chest.  Cardiac silhouette is not enlarged.  There is no focal consolidation.  No sizable pleural effusion.  No pneumothorax.  No detrimental change.                                       APC / Resident Notes:   68 year old female with progressive dementia - AMS, questionable syncope 815 this morning.      DDx includes but is not limited to underlying infection such as UTI, pneumonia, metabolic derangement, medication reaction, opiate overdose.    Labs are grossly unremarkable  Urinalysis with 1+ occult blood, negative leukocytes  UDS negative  Head CT with no acute findings    The patient was given ativan with significant improvement in her clinical status. I discussed the findings with the patient and family - offered admission for observation of AMS. Her son would prefer to bring her home, where he has 24hr home care. I feel that this is reasonable given absence of objective findings. I recommended outpatient f/u with Neurology. He verbalized understanding and agrees with course of treatment. Return to ED precautions given. He is stable for discharge. I discussed the care of this patient with my supervising MD.                    Clinical Impression:   The primary encounter diagnosis was Altered mental status. A diagnosis of Syncope, unspecified syncope type was also pertinent to this visit.      Disposition:   Disposition: " Discharged  Condition: Stable                        Goldie Mcnamara PA-C  08/17/18 5020

## 2018-08-17 NOTE — ED NOTES
Patient resting on stretcher, opens eyes to voice.  Much more calm and cooperative at this time. VSS. Side rails up X 2 with sitter at bedside.  Will continue to monitor.  PA aware

## 2018-08-17 NOTE — ED TRIAGE NOTES
Gris Ken, a 68 y.o. female presents to the ED arrived via  EMS with altered mental status, pt had pinpoint pupils upon EMS arrival and was given 1mg narcan which made her more reponsive pt with incomprehensible speech and combative behavior.   Pt has hx of dementia.       Chief Complaint   Patient presents with    Altered Mental Status     found by son, given narcan and became a little reposonsive, hx of dementia     Review of patient's allergies indicates:   Allergen Reactions    Codeine      Other reaction(s): Vomiting,Tachyc  vomitting and tachycardia    Percocet [oxycodone-acetaminophen]      Other reaction(s): Hallucinations  hallucinations    Quinine (bulk)     Iodinated contrast- oral and iv dye Swelling     Other reaction(s): Hypotension    Iodine      Other reaction(s): Swelling,Hypote    Oxycodone      Other reaction(s): Hallucinations    Quinine      Other reaction(s): Muscle pain     Past Medical History:   Diagnosis Date    Anxiety     Diabetes mellitus     Diabetes mellitus type II     Diabetic retinopathy     DJD (degenerative joint disease)     Fatty liver     Glaucoma suspect with open angle     Hyperlipidemia     Hypertension      LOC: Patient name and date of birth verified. The patient is awake  APPEARANCE: Patient resting comfortably, patient is clean and well groomed, patient's clothing is properly fastened.  SKIN: The skin is warm and dry, color consistent with ethnicity, patient has normal skin turgor and moist mucus membranes, skin intact, no breakdown or bruising noted.  MUSCULOSKELETAL: Patient moving all extremities well, no obvious swelling or deformities noted.   RESPIRATORY: Respirations are spontaneous, patient has a normal effort and rate, no accessory muscle use noted.  CARDIAC: Patient has a normal rate and rhythm, no periphreal edema noted, capillary refill < 3 seconds.  ABDOMEN: Soft and non tender to palpation, no distention noted. Bowel sounds present  in all four quadrants.  NEUROLOGIC: Eyes open spontaneously, does not follows commands, purposeful motor response noted,

## 2018-10-30 ENCOUNTER — TELEPHONE (OUTPATIENT)
Dept: FAMILY MEDICINE | Facility: CLINIC | Age: 68
End: 2018-10-30

## 2018-10-30 NOTE — TELEPHONE ENCOUNTER
----- Message from Manjula Carrington sent at 10/30/2018  3:35 PM CDT -----  Contact: call luis lindsay [phone]  Calling about medical question form for his mom to be admitted to nursing home, needs to finish questions with you to complete form, starting filling out got about 90% done and had to finish call,  Please call back to finish and get submitted to home

## 2018-10-31 NOTE — TELEPHONE ENCOUNTER
Detailed VM left for Dariel, patient's son. Advising per Dr. Welsh that the forms are ready for  & at the .

## 2018-11-14 ENCOUNTER — OUTSIDE PLACE OF SERVICE (OUTPATIENT)
Dept: FAMILY MEDICINE | Facility: CLINIC | Age: 68
End: 2018-11-14
Payer: COMMERCIAL

## 2018-11-14 PROCEDURE — 99305 1ST NF CARE MODERATE MDM 35: CPT | Mod: ,,, | Performed by: FAMILY MEDICINE

## 2018-11-25 ENCOUNTER — HOSPITAL ENCOUNTER (EMERGENCY)
Facility: HOSPITAL | Age: 68
Discharge: HOME | End: 2018-11-25
Attending: EMERGENCY MEDICINE
Payer: COMMERCIAL

## 2018-11-25 VITALS
HEART RATE: 86 BPM | TEMPERATURE: 98 F | SYSTOLIC BLOOD PRESSURE: 140 MMHG | HEIGHT: 65 IN | RESPIRATION RATE: 16 BRPM | BODY MASS INDEX: 24.99 KG/M2 | DIASTOLIC BLOOD PRESSURE: 73 MMHG | OXYGEN SATURATION: 100 % | WEIGHT: 150 LBS

## 2018-11-25 DIAGNOSIS — F03.90 DEMENTIA WITHOUT BEHAVIORAL DISTURBANCE, UNSPECIFIED DEMENTIA TYPE: Primary | ICD-10-CM

## 2018-11-25 DIAGNOSIS — R56.9 SEIZURE: ICD-10-CM

## 2018-11-25 LAB
ALBUMIN SERPL BCP-MCNC: 3.7 G/DL
ALP SERPL-CCNC: 57 U/L
ALT SERPL W/O P-5'-P-CCNC: 8 U/L
ANION GAP SERPL CALC-SCNC: 6 MMOL/L
AST SERPL-CCNC: 11 U/L
BILIRUB SERPL-MCNC: 0.3 MG/DL
BILIRUB UR QL STRIP: NEGATIVE
BUN SERPL-MCNC: 11 MG/DL
CALCIUM SERPL-MCNC: 9 MG/DL
CHLORIDE SERPL-SCNC: 106 MMOL/L
CLARITY UR: CLEAR
CO2 SERPL-SCNC: 29 MMOL/L
COLOR UR: YELLOW
CREAT SERPL-MCNC: 1 MG/DL
ERYTHROCYTE [DISTWIDTH] IN BLOOD BY AUTOMATED COUNT: 12.8 %
EST. GFR  (AFRICAN AMERICAN): >60 ML/MIN/1.73 M^2
EST. GFR  (NON AFRICAN AMERICAN): 58 ML/MIN/1.73 M^2
GLUCOSE SERPL-MCNC: 207 MG/DL
GLUCOSE UR QL STRIP: ABNORMAL
HCT VFR BLD AUTO: 42.5 %
HGB BLD-MCNC: 14.2 G/DL
HGB UR QL STRIP: ABNORMAL
KETONES UR QL STRIP: NEGATIVE
LEUKOCYTE ESTERASE UR QL STRIP: NEGATIVE
MCH RBC QN AUTO: 28.9 PG
MCHC RBC AUTO-ENTMCNC: 33.4 G/DL
MCV RBC AUTO: 86 FL
NITRITE UR QL STRIP: NEGATIVE
PH UR STRIP: 6 [PH] (ref 5–8)
PLATELET # BLD AUTO: 184 K/UL
PMV BLD AUTO: 9.5 FL
POTASSIUM SERPL-SCNC: 3.9 MMOL/L
PROT SERPL-MCNC: 6.1 G/DL
PROT UR QL STRIP: NEGATIVE
RBC # BLD AUTO: 4.92 M/UL
SODIUM SERPL-SCNC: 141 MMOL/L
SP GR UR STRIP: 1.02 (ref 1–1.03)
TROPONIN I SERPL DL<=0.01 NG/ML-MCNC: <0.006 NG/ML
URN SPEC COLLECT METH UR: ABNORMAL
UROBILINOGEN UR STRIP-ACNC: NEGATIVE EU/DL
WBC # BLD AUTO: 3.43 K/UL

## 2018-11-25 PROCEDURE — 99285 EMERGENCY DEPT VISIT HI MDM: CPT | Mod: 25

## 2018-11-25 PROCEDURE — 84484 ASSAY OF TROPONIN QUANT: CPT

## 2018-11-25 PROCEDURE — 93005 ELECTROCARDIOGRAM TRACING: CPT

## 2018-11-25 PROCEDURE — 81003 URINALYSIS AUTO W/O SCOPE: CPT

## 2018-11-25 PROCEDURE — 93010 ELECTROCARDIOGRAM REPORT: CPT | Mod: ,,, | Performed by: INTERNAL MEDICINE

## 2018-11-25 PROCEDURE — 85027 COMPLETE CBC AUTOMATED: CPT

## 2018-11-25 PROCEDURE — 80053 COMPREHEN METABOLIC PANEL: CPT

## 2018-11-25 NOTE — ED PROVIDER NOTES
Encounter Date: 11/25/2018    SCRIBE #1 NOTE: I, Niranjan Clark, am scribing for, and in the presence of,  Dr. Cheung. I have scribed the entire note.       History     Chief Complaint   Patient presents with    Seizures     ems reports new onset of witness seizure. reports grand mal seizure lasted few mins. denies vomiting after. [facility]peas [place] reports snoring respirations after seizure. EMS reports pt was positcal on arrival to nursing home. hx of dementia.     Gris Ken is a 68 y.o. female who  has a past medical history of Anxiety, Diabetes mellitus, Diabetes mellitus type II, Diabetic retinopathy, DJD (degenerative joint disease), Fatty liver, Glaucoma suspect with open angle, Hyperlipidemia, and Hypertension.    The patient presents to the ED via EMS due to seizure-like activity. Patient with history of dementia, currently resident of [facility]. Unknown duration, but per EMS lasted a few minutes. Patient was post-ictal afterward but recovered to baseline mental status prior to arrival. Patient awake but pleasantly demented on arrival.     History obtained from EMS and chart review.        The history is provided by the EMS personnel.     Review of patient's allergies indicates:   Allergen Reactions    Codeine      Other reaction(s): Vomiting,Tachyc  vomitting and tachycardia    Percocet [oxycodone-acetaminophen]      Other reaction(s): Hallucinations  hallucinations    Quinine (bulk)     Iodinated contrast- oral and iv dye Swelling     Other reaction(s): Hypotension    Iodine      Other reaction(s): Swelling,Hypote    Oxycodone      Other reaction(s): Hallucinations    Quinine      Other reaction(s): Muscle pain     Past Medical History:   Diagnosis Date    Anxiety     Diabetes mellitus     Diabetes mellitus type II     Diabetic retinopathy     DJD (degenerative joint disease)     Fatty liver     Glaucoma suspect with open angle     Hyperlipidemia     Hypertension      Past Surgical  History:   Procedure Laterality Date    CATARACT EXTRACTION      CATARACT EXTRACTION BILATERAL W/ ANTERIOR VITRECTOMY      CHOLECYSTECTOMY      COLONOSCOPY  2010     Family History   Problem Relation Age of Onset    Cancer Mother         colon    Diabetes Father     Diabetes Sister     Cancer Brother         esophagus     Social History     Tobacco Use    Smoking status: Former Smoker     Types: Cigarettes    Smokeless tobacco: Never Used   Substance Use Topics    Alcohol use: No     Alcohol/week: 0.0 oz    Drug use: No     Review of Systems   Unable to perform ROS: Dementia       Physical Exam     Initial Vitals [11/25/18 0851]   BP Pulse Resp Temp SpO2   (!) 158/79 96 (!) 22 97.8 °F (36.6 °C) 98 %      MAP       --         Physical Exam    Nursing note and vitals reviewed.  Constitutional: She appears well-developed and well-nourished. She is not diaphoretic. No distress.   Aphasic, largely nonverbal.  Pleasantly demented.   HENT:   Head: Normocephalic and atraumatic.   Mouth/Throat: Oropharynx is clear and moist.   Eyes: EOM are normal. Pupils are equal, round, and reactive to light.   Neck: No tracheal deviation present.   Cardiovascular: Normal rate, regular rhythm, normal heart sounds and intact distal pulses.   Pulmonary/Chest: Breath sounds normal. No stridor. No respiratory distress. She has no wheezes.   Abdominal: Soft. Bowel sounds are normal. She exhibits no distension and no mass. There is no tenderness.   Musculoskeletal: Normal range of motion. She exhibits no edema.   Neurological: She is alert. She has normal strength. No cranial nerve deficit or sensory deficit. GCS eye subscore is 4. GCS verbal subscore is 2. GCS motor subscore is 6.   Follows simple commands, moves all extremities.   Skin: Skin is warm and dry. Capillary refill takes less than 2 seconds. No pallor.   Psychiatric: She has a normal mood and affect. Her behavior is normal. Thought content normal.         ED Course    Procedures  Labs Reviewed   CBC WITHOUT DIFFERENTIAL - Abnormal; Notable for the following components:       Result Value    WBC 3.43 (*)     All other components within normal limits   COMPREHENSIVE METABOLIC PANEL - Abnormal; Notable for the following components:    Glucose 207 (*)     ALT 8 (*)     Anion Gap 6 (*)     eGFR if non  58 (*)     All other components within normal limits   URINALYSIS, REFLEX TO URINE CULTURE - Abnormal; Notable for the following components:    Glucose, UA Trace (*)     Occult Blood UA Trace (*)     All other components within normal limits    Narrative:     Preferred Collection Type->Urine, Clean Catch   TROPONIN I     EKG Readings: (Independently Interpreted)   NSR, rate 95, no ST changes, no ischemia, normal intervals.  Compared with prior EKG dated 08/2018, grossly stable without significant change.       Imaging Results          CT Head Without Contrast (Final result)  Result time 11/25/18 09:49:46    Final result by Miguel Ling MD (11/25/18 09:49:46)                 Impression:      No acute intracranial abnormality.      Electronically signed by: Miguel Ling MD  Date:    11/25/2018  Time:    09:49             Narrative:    EXAMINATION:  CT HEAD WITHOUT CONTRAST    CLINICAL HISTORY:  new onset seizure, history of dementia;    TECHNIQUE:  Low dose axial images were obtained through the head.  Coronal and sagittal reformations were also performed. Contrast was not administered.    COMPARISON:  August 17, 2018    FINDINGS:  The cortical sulcal pattern is stable with age-related atrophy..  No hydrocephalus, midline shift or abnormal mass effect present. No intra-or extra-axial mass or hemorrhage. No CT evidence of large territory acute stroke.    Orbits are unremarkable. Paranasal sinuses are clear. Mastoid air cells are clear. Calvarium is intact.                                   Medical Decision Making:   History:   Old Medical Records: I decided  to obtain old medical records.       <> Summary of Records: History of dementia and aphasia   DNR as of 11/9/18  Labs drawn on 11/12  Current resident at the [facility]  Initial Assessment:   Patient is a 68 y.o. female presenting to ED after a witnessed seizure with postictal period. The patient is back to baseline in the ER. On arrival, vitals and  exam reassuring. Plan to obtain CT head, basic labs, and will continue to monitor.  Differential Diagnosis:   Differential Diagnosis includes, but is not limited to:  Arrhythmia, aortic dissection, MI/unstable angina, PE, cardiogenic shock, CHF, CVA/TIA, intracranial lesion/mass, seizure, perforated viscous, ruptured AAA, orthostatic hypotension, vasovagal episode, anemia, dehydration, medication reaction, intentional overdose    Clinical Tests:   Lab Tests: Ordered and Reviewed  Radiological Study: Reviewed and Ordered  ED Management:  CT head without acute process.  Labs and UA unremarkable, no evidence of infection.  Patient back to baseline mental status in the ER, without recurrent seizure activity.  No evidence of reversible or other acute process at this time.  Given 1 seizure that was self-limited without focal deficits or other acute process on our workup today, I do not feel initiation of AED is indicated at this time.  Recommend close follow-up with PCP for further evaluation and management.  Discharge paperwork instructed to monitor for recurrent seizures, and return to the ER for any worsening symptoms, recurrent seizures, or any other concerns.  Upon re-evaluation, the patient's status has improved.  After complete ED evaluation, clinical impression is most consistent with seizure.  PCP follow-up within 1 week was recommended.    After taking into careful account the patient's history, physical exam findings, as well as empirical and objective data obtained throughout ED workup, I feel no emergent medical condition has been identified. No  further evaluation or admission was felt to be required, and the patient is stable for discharge from the ED. The patient and any additional family present were updated with test results, overall clinical impression, and recommended further plan of care, including discharge instructions as provided and outpatient follow-up for continued evaluation and management as needed. All questions were answered. The patient expressed understanding and agreed with current plan for discharge and follow-up plan of care. Strict ED return precautions were provided, including return/worsening of current symptoms, new symptoms, or any other concerns.                        Clinical Impression:     1. Dementia without behavioral disturbance, unspecified dementia type    2. Seizure        Disposition:   Disposition: Discharged  Condition: Stable       I, Dr. Pradip Cheung, personally performed the services described in this documentation. All medical record entries made by the scribe were at my direction and in my presence.  I have reviewed the chart and agree that the record reflects my personal performance and is accurate and complete.     Pradip Cheung MD.             Pradip Cheung MD  12/04/18 6034

## 2018-11-25 NOTE — DISCHARGE INSTRUCTIONS
Monitor closely for recurrent seizures. Return to ED for any worsening symptoms, recurrent seizures, or any other concerns.

## 2018-11-25 NOTE — ED NOTES
Report received from JANET Stewart. Pt trying to get out of bed and walk around. Pulling IV out. Will continue to monitor.

## 2018-11-25 NOTE — ED NOTES
APPEARANCE: Alert, oriented and in no acute distress.  CARDIAC: Normal rate and rhythm, no murmur heard.   PERIPHERAL VASCULAR: peripheral pulses present. Normal cap refill. No edema. Warm to touch.    RESPIRATORY:Normal rate and effort, breath sounds clear bilaterally throughout chest. Respirations are equal and unlabored no obvious signs of distress.  GASTRO: soft, bowel sounds normal, no tenderness, no abdominal distention.  MUSC: Full ROM. No bony tenderness or soft tissue tenderness. No obvious deformity.  SKIN: Skin is warm and dry, normal skin turgor, mucous membranes moist.  MENTAL STATUS: awake, alert and aware of environment.  EYE: PERRL, both eyes: pupils brisk and reactive to light. Normal size.  ENT: EARS: no obvious drainage. NOSE: no active bleeding.   BREAST: symmetrical. No masses. No tenderness.  GENITALIA: Normal external genitalia.

## 2018-11-27 ENCOUNTER — OUTSIDE PLACE OF SERVICE (OUTPATIENT)
Dept: FAMILY MEDICINE | Facility: CLINIC | Age: 68
End: 2018-11-27
Payer: COMMERCIAL

## 2018-11-27 PROCEDURE — 99308 SBSQ NF CARE LOW MDM 20: CPT | Mod: ,,, | Performed by: FAMILY MEDICINE

## 2019-01-10 ENCOUNTER — OUTSIDE PLACE OF SERVICE (OUTPATIENT)
Dept: ADMINISTRATIVE | Facility: OTHER | Age: 69
End: 2019-01-10
Payer: COMMERCIAL

## 2019-01-10 PROCEDURE — 99308 PR NURSING FAC CARE, SUBSEQ, MINOR COMPLIC: ICD-10-PCS | Mod: ,,, | Performed by: FAMILY MEDICINE

## 2019-01-10 PROCEDURE — 99308 SBSQ NF CARE LOW MDM 20: CPT | Mod: ,,, | Performed by: FAMILY MEDICINE

## 2019-03-14 ENCOUNTER — OUTSIDE PLACE OF SERVICE (OUTPATIENT)
Dept: FAMILY MEDICINE | Facility: CLINIC | Age: 69
End: 2019-03-14
Payer: COMMERCIAL

## 2019-03-14 PROCEDURE — 99308 PR NURSING FAC CARE, SUBSEQ, MINOR COMPLIC: ICD-10-PCS | Mod: ,,, | Performed by: FAMILY MEDICINE

## 2019-03-14 PROCEDURE — 99308 SBSQ NF CARE LOW MDM 20: CPT | Mod: ,,, | Performed by: FAMILY MEDICINE

## 2019-05-09 ENCOUNTER — OUTSIDE PLACE OF SERVICE (OUTPATIENT)
Dept: FAMILY MEDICINE | Facility: CLINIC | Age: 69
End: 2019-05-09
Payer: COMMERCIAL

## 2019-05-09 PROCEDURE — 99308 SBSQ NF CARE LOW MDM 20: CPT | Mod: ,,, | Performed by: FAMILY MEDICINE

## 2019-05-09 PROCEDURE — 99308 PR NURSING FAC CARE, SUBSEQ, MINOR COMPLIC: ICD-10-PCS | Mod: ,,, | Performed by: FAMILY MEDICINE

## 2019-05-28 ENCOUNTER — PATIENT MESSAGE (OUTPATIENT)
Dept: ADMINISTRATIVE | Facility: HOSPITAL | Age: 69
End: 2019-05-28

## 2019-07-11 ENCOUNTER — OUTSIDE PLACE OF SERVICE (OUTPATIENT)
Dept: FAMILY MEDICINE | Facility: CLINIC | Age: 69
End: 2019-07-11
Payer: COMMERCIAL

## 2019-07-11 PROCEDURE — 99307 PR NURSING FAC CARE, SUBSEQ, IMPROVING: ICD-10-PCS | Mod: ,,, | Performed by: FAMILY MEDICINE

## 2019-07-11 PROCEDURE — 99307 SBSQ NF CARE SF MDM 10: CPT | Mod: ,,, | Performed by: FAMILY MEDICINE

## 2019-08-07 ENCOUNTER — PATIENT OUTREACH (OUTPATIENT)
Dept: ADMINISTRATIVE | Facility: HOSPITAL | Age: 69
End: 2019-08-07

## 2019-09-11 ENCOUNTER — OUTSIDE PLACE OF SERVICE (OUTPATIENT)
Dept: FAMILY MEDICINE | Facility: CLINIC | Age: 69
End: 2019-09-11
Payer: COMMERCIAL

## 2019-09-11 PROCEDURE — 99307 SBSQ NF CARE SF MDM 10: CPT | Mod: ,,, | Performed by: FAMILY MEDICINE

## 2019-09-11 PROCEDURE — 99307 PR NURSING FAC CARE, SUBSEQ, IMPROVING: ICD-10-PCS | Mod: ,,, | Performed by: FAMILY MEDICINE

## 2020-04-29 ENCOUNTER — OUTSIDE PLACE OF SERVICE (OUTPATIENT)
Dept: FAMILY MEDICINE | Facility: CLINIC | Age: 70
End: 2020-04-29
Payer: COMMERCIAL

## 2020-04-29 PROCEDURE — 99308 PR NURSING FAC CARE, SUBSEQ, MINOR COMPLIC: ICD-10-PCS | Mod: ,,, | Performed by: FAMILY MEDICINE

## 2020-04-29 PROCEDURE — 99308 SBSQ NF CARE LOW MDM 20: CPT | Mod: ,,, | Performed by: FAMILY MEDICINE

## 2020-04-30 ENCOUNTER — OUTSIDE PLACE OF SERVICE (OUTPATIENT)
Dept: FAMILY MEDICINE | Facility: CLINIC | Age: 70
End: 2020-04-30
Payer: COMMERCIAL

## 2020-04-30 PROCEDURE — 99499 UNLISTED E&M SERVICE: CPT | Mod: ,,, | Performed by: FAMILY MEDICINE

## 2020-04-30 PROCEDURE — 99499 NO LOS: ICD-10-PCS | Mod: ,,, | Performed by: FAMILY MEDICINE

## 2020-05-06 ENCOUNTER — OUTSIDE PLACE OF SERVICE (OUTPATIENT)
Dept: FAMILY MEDICINE | Facility: CLINIC | Age: 70
End: 2020-05-06
Payer: COMMERCIAL

## 2020-05-06 PROCEDURE — 99307 PR NURSING FAC CARE, SUBSEQ, IMPROVING: ICD-10-PCS | Mod: ,,, | Performed by: FAMILY MEDICINE

## 2020-05-06 PROCEDURE — 99307 SBSQ NF CARE SF MDM 10: CPT | Mod: ,,, | Performed by: FAMILY MEDICINE

## 2020-05-14 ENCOUNTER — OUTSIDE PLACE OF SERVICE (OUTPATIENT)
Dept: FAMILY MEDICINE | Facility: CLINIC | Age: 70
End: 2020-05-14
Payer: COMMERCIAL

## 2020-05-14 PROCEDURE — 99307 SBSQ NF CARE SF MDM 10: CPT | Mod: ,,, | Performed by: FAMILY MEDICINE

## 2020-05-14 PROCEDURE — 99307 PR NURSING FAC CARE, SUBSEQ, IMPROVING: ICD-10-PCS | Mod: ,,, | Performed by: FAMILY MEDICINE

## 2020-05-20 ENCOUNTER — OUTSIDE PLACE OF SERVICE (OUTPATIENT)
Dept: FAMILY MEDICINE | Facility: CLINIC | Age: 70
End: 2020-05-20
Payer: COMMERCIAL

## 2020-05-20 PROCEDURE — 99308 PR NURSING FAC CARE, SUBSEQ, MINOR COMPLIC: ICD-10-PCS | Mod: ,,, | Performed by: FAMILY MEDICINE

## 2020-05-20 PROCEDURE — 99308 SBSQ NF CARE LOW MDM 20: CPT | Mod: ,,, | Performed by: FAMILY MEDICINE

## 2020-05-27 ENCOUNTER — OUTSIDE PLACE OF SERVICE (OUTPATIENT)
Dept: FAMILY MEDICINE | Facility: CLINIC | Age: 70
End: 2020-05-27
Payer: COMMERCIAL

## 2020-05-27 PROCEDURE — 99307 PR NURSING FAC CARE, SUBSEQ, IMPROVING: ICD-10-PCS | Mod: ,,, | Performed by: FAMILY MEDICINE

## 2020-05-27 PROCEDURE — 99307 SBSQ NF CARE SF MDM 10: CPT | Mod: ,,, | Performed by: FAMILY MEDICINE

## 2020-06-02 ENCOUNTER — HOSPITAL ENCOUNTER (EMERGENCY)
Facility: HOSPITAL | Age: 70
Discharge: HOME | End: 2020-06-02
Attending: EMERGENCY MEDICINE
Payer: COMMERCIAL

## 2020-06-02 VITALS
OXYGEN SATURATION: 100 % | BODY MASS INDEX: 20.06 KG/M2 | WEIGHT: 120.56 LBS | DIASTOLIC BLOOD PRESSURE: 50 MMHG | HEART RATE: 88 BPM | SYSTOLIC BLOOD PRESSURE: 93 MMHG | TEMPERATURE: 98 F | RESPIRATION RATE: 18 BRPM

## 2020-06-02 DIAGNOSIS — Z79.4 TYPE 2 DIABETES MELLITUS WITH HYPERGLYCEMIA, WITH LONG-TERM CURRENT USE OF INSULIN: Primary | ICD-10-CM

## 2020-06-02 DIAGNOSIS — U07.1 COVID-19 VIRUS DETECTED: ICD-10-CM

## 2020-06-02 DIAGNOSIS — E46 MALNUTRITION, UNSPECIFIED TYPE: ICD-10-CM

## 2020-06-02 DIAGNOSIS — N17.9 ACUTE KIDNEY INJURY: ICD-10-CM

## 2020-06-02 DIAGNOSIS — E86.0 DEHYDRATION: ICD-10-CM

## 2020-06-02 DIAGNOSIS — F03.91 DEMENTIA WITH BEHAVIORAL DISTURBANCE, UNSPECIFIED DEMENTIA TYPE: ICD-10-CM

## 2020-06-02 DIAGNOSIS — E11.65 TYPE 2 DIABETES MELLITUS WITH HYPERGLYCEMIA, WITH LONG-TERM CURRENT USE OF INSULIN: Primary | ICD-10-CM

## 2020-06-02 LAB
ALBUMIN SERPL BCP-MCNC: 3.6 G/DL (ref 3.5–5.2)
ALP SERPL-CCNC: 109 U/L (ref 38–126)
ALT SERPL W/O P-5'-P-CCNC: 15 U/L (ref 10–44)
ANION GAP SERPL CALC-SCNC: 19 MMOL/L (ref 8–16)
AST SERPL-CCNC: 26 U/L (ref 15–46)
BACTERIA #/AREA URNS AUTO: ABNORMAL /HPF
BASOPHILS # BLD AUTO: 0.02 K/UL (ref 0–0.2)
BASOPHILS NFR BLD: 0.5 % (ref 0–1.9)
BILIRUB SERPL-MCNC: 0.9 MG/DL (ref 0.1–1)
BILIRUB UR QL STRIP: ABNORMAL
BUN SERPL-MCNC: 53 MG/DL (ref 7–17)
CALCIUM SERPL-MCNC: 8.9 MG/DL (ref 8.7–10.5)
CHLORIDE SERPL-SCNC: 101 MMOL/L (ref 95–110)
CLARITY UR REFRACT.AUTO: ABNORMAL
CO2 SERPL-SCNC: 23 MMOL/L (ref 23–29)
COLOR UR AUTO: ABNORMAL
CREAT SERPL-MCNC: 1.72 MG/DL (ref 0.5–1.4)
DIFFERENTIAL METHOD: ABNORMAL
EOSINOPHIL # BLD AUTO: 0.1 K/UL (ref 0–0.5)
EOSINOPHIL NFR BLD: 1.3 % (ref 0–8)
ERYTHROCYTE [DISTWIDTH] IN BLOOD BY AUTOMATED COUNT: 14.2 % (ref 11.5–14.5)
EST. GFR  (AFRICAN AMERICAN): 34 ML/MIN/1.73 M^2
EST. GFR  (NON AFRICAN AMERICAN): 29.5 ML/MIN/1.73 M^2
GLUCOSE SERPL-MCNC: 300 MG/DL (ref 70–110)
GLUCOSE UR QL STRIP: NEGATIVE
HCT VFR BLD AUTO: 42.3 % (ref 37–48.5)
HGB BLD-MCNC: 13.5 G/DL (ref 12–16)
HGB UR QL STRIP: NEGATIVE
HYALINE CASTS UR QL AUTO: 0 /LPF
IMM GRANULOCYTES # BLD AUTO: 0.02 K/UL (ref 0–0.04)
IMM GRANULOCYTES NFR BLD AUTO: 0.5 % (ref 0–0.5)
KETONES UR QL STRIP: ABNORMAL
LACTATE SERPL-SCNC: 1.8 MMOL/L (ref 0.5–2.2)
LEUKOCYTE ESTERASE UR QL STRIP: ABNORMAL
LYMPHOCYTES # BLD AUTO: 0.8 K/UL (ref 1–4.8)
LYMPHOCYTES NFR BLD: 22.4 % (ref 18–48)
MAGNESIUM SERPL-MCNC: 1.8 MG/DL (ref 1.6–2.6)
MCH RBC QN AUTO: 29.5 PG (ref 27–31)
MCHC RBC AUTO-ENTMCNC: 31.9 G/DL (ref 32–36)
MCV RBC AUTO: 92 FL (ref 82–98)
MICROSCOPIC COMMENT: ABNORMAL
MONOCYTES # BLD AUTO: 0.3 K/UL (ref 0.3–1)
MONOCYTES NFR BLD: 7.5 % (ref 4–15)
NEUTROPHILS # BLD AUTO: 2.5 K/UL (ref 1.8–7.7)
NEUTROPHILS NFR BLD: 67.8 % (ref 38–73)
NITRITE UR QL STRIP: NEGATIVE
NRBC BLD-RTO: 0 /100 WBC
PH UR STRIP: 5 [PH] (ref 5–8)
PLATELET # BLD AUTO: 298 K/UL (ref 150–350)
PMV BLD AUTO: 11 FL (ref 9.2–12.9)
POCT GLUCOSE: 257 MG/DL (ref 70–110)
POTASSIUM SERPL-SCNC: 3.4 MMOL/L (ref 3.5–5.1)
PROT SERPL-MCNC: 6.3 G/DL (ref 6–8.4)
PROT UR QL STRIP: ABNORMAL
RBC # BLD AUTO: 4.58 M/UL (ref 4–5.4)
RBC #/AREA URNS AUTO: 0 /HPF (ref 0–4)
SARS-COV-2 RDRP RESP QL NAA+PROBE: POSITIVE
SODIUM SERPL-SCNC: 143 MMOL/L (ref 136–145)
SP GR UR STRIP: 1.02 (ref 1–1.03)
URN SPEC COLLECT METH UR: ABNORMAL
UROBILINOGEN UR STRIP-ACNC: 1 EU/DL
WBC # BLD AUTO: 3.71 K/UL (ref 3.9–12.7)
WBC #/AREA URNS AUTO: 3 /HPF (ref 0–5)

## 2020-06-02 PROCEDURE — 87077 CULTURE AEROBIC IDENTIFY: CPT | Mod: ER

## 2020-06-02 PROCEDURE — 25000003 PHARM REV CODE 250: Mod: ER | Performed by: EMERGENCY MEDICINE

## 2020-06-02 PROCEDURE — 63600175 PHARM REV CODE 636 W HCPCS: Mod: ER | Performed by: EMERGENCY MEDICINE

## 2020-06-02 PROCEDURE — 82962 GLUCOSE BLOOD TEST: CPT | Mod: ER

## 2020-06-02 PROCEDURE — 85025 COMPLETE CBC W/AUTO DIFF WBC: CPT | Mod: ER

## 2020-06-02 PROCEDURE — 87040 BLOOD CULTURE FOR BACTERIA: CPT | Mod: ER

## 2020-06-02 PROCEDURE — 80053 COMPREHEN METABOLIC PANEL: CPT | Mod: ER

## 2020-06-02 PROCEDURE — 81000 URINALYSIS NONAUTO W/SCOPE: CPT | Mod: ER

## 2020-06-02 PROCEDURE — 96374 THER/PROPH/DIAG INJ IV PUSH: CPT | Mod: ER

## 2020-06-02 PROCEDURE — 83605 ASSAY OF LACTIC ACID: CPT | Mod: ER

## 2020-06-02 PROCEDURE — 96361 HYDRATE IV INFUSION ADD-ON: CPT | Mod: ER

## 2020-06-02 PROCEDURE — 94760 N-INVAS EAR/PLS OXIMETRY 1: CPT | Mod: ER

## 2020-06-02 PROCEDURE — 83735 ASSAY OF MAGNESIUM: CPT | Mod: ER

## 2020-06-02 PROCEDURE — U0002 COVID-19 LAB TEST NON-CDC: HCPCS | Mod: ER

## 2020-06-02 PROCEDURE — 87186 SC STD MICRODIL/AGAR DIL: CPT | Mod: ER

## 2020-06-02 PROCEDURE — 99284 EMERGENCY DEPT VISIT MOD MDM: CPT | Mod: 25,ER

## 2020-06-02 RX ADMIN — LORAZEPAM 1 MG: 2 INJECTION INTRAMUSCULAR; INTRAVENOUS at 05:06

## 2020-06-02 RX ADMIN — SODIUM CHLORIDE 1000 ML: 0.9 INJECTION, SOLUTION INTRAVENOUS at 04:06

## 2020-06-02 RX ADMIN — SODIUM CHLORIDE 1000 ML: 0.9 INJECTION, SOLUTION INTRAVENOUS at 07:06

## 2020-06-02 NOTE — ED PROVIDER NOTES
Encounter Date: 6/2/2020       History     Chief Complaint   Patient presents with    Fall    Hypotension     The patient is a 70-year-old female.  She has a history of anxiety, type II diabetes mellitus, diabetic retinopathy, degenerative joint disease, fatty liver, glaucoma, hyperlipidemia, and hypertension.  She also has a history of dementia.  Transporting ENT reports that patient is at her baseline mental status.  She is a resident at the [facility].  She was found on the ground by staff at that facility presumably from an unwitnessed fall and sent to the ED for evaluation.      The history is provided by the EMS personnel. The history is limited by the condition of the patient.     Review of patient's allergies indicates:   Allergen Reactions    Codeine      Other reaction(s): Vomiting,Tachyc  vomitting and tachycardia    Percocet [oxycodone-acetaminophen]      Other reaction(s): Hallucinations  hallucinations    Quinine (bulk)     Iodinated contrast media Swelling     Other reaction(s): Hypotension    Iodine      Other reaction(s): Swelling,Hypote    Oxycodone      Other reaction(s): Hallucinations    Quinine      Other reaction(s): Muscle pain     Past Medical History:   Diagnosis Date    Anxiety     Diabetes mellitus     Diabetes mellitus type II     Diabetic retinopathy     DJD (degenerative joint disease)     Fatty liver     Glaucoma suspect with open angle     Hyperlipidemia     Hypertension      Past Surgical History:   Procedure Laterality Date    CATARACT EXTRACTION      CATARACT EXTRACTION BILATERAL W/ ANTERIOR VITRECTOMY      CHOLECYSTECTOMY      COLONOSCOPY  2010     Family History   Problem Relation Age of Onset    Cancer Mother         colon    Diabetes Father     Diabetes Sister     Cancer Brother         esophagus     Social History     Tobacco Use    Smoking status: Former Smoker     Types: Cigarettes    Smokeless tobacco: Never Used    Substance Use Topics    Alcohol use: No     Alcohol/week: 0.0 standard drinks    Drug use: No     Review of Systems   Unable to perform ROS: Dementia       Physical Exam     Initial Vitals [06/02/20 1553]   BP Pulse Resp Temp SpO2   93/62 100 18 97.7 °F (36.5 °C) 100 %      MAP       --         Physical Exam    Vitals reviewed.  Constitutional: She is not diaphoretic.   HENT:   Head: Normocephalic and atraumatic.   Eyes: Conjunctivae are normal. Pupils are equal, round, and reactive to light. No scleral icterus.   Cardiovascular: Normal rate, regular rhythm and normal heart sounds. Exam reveals no gallop and no friction rub.    No murmur heard.  Pulmonary/Chest: Breath sounds normal. No stridor. No respiratory distress. She has no wheezes. She has no rhonchi. She has no rales.   Abdominal: Soft. She exhibits no distension. There is no tenderness.   Musculoskeletal: She exhibits no edema or tenderness.   Skin: Skin is warm and dry. No pallor.   Psychiatric: She is agitated. She is inattentive.         ED Course   Procedures  Labs Reviewed   CBC W/ AUTO DIFFERENTIAL - Abnormal; Notable for the following components:       Result Value    WBC 3.71 (*)     Mean Corpuscular Hemoglobin Conc 31.9 (*)     Lymph # 0.8 (*)     All other components within normal limits   COMPREHENSIVE METABOLIC PANEL - Abnormal; Notable for the following components:    Potassium 3.4 (*)     Glucose 300 (*)     BUN, Bld 53 (*)     Creatinine 1.72 (*)     Anion Gap 19 (*)     eGFR if  34.0 (*)     eGFR if non  29.5 (*)     All other components within normal limits   URINALYSIS, REFLEX TO URINE CULTURE - Abnormal; Notable for the following components:    Appearance, UA Hazy (*)     Protein, UA 1+ (*)     Ketones, UA 2+ (*)     Bilirubin (UA) 1+ (*)     Leukocytes, UA 2+ (*)     All other components within normal limits    Narrative:     Preferred Collection Type->Urine, Catheterized   SARS-COV-2 RNA  AMPLIFICATION, QUAL - Abnormal; Notable for the following components:    SARS-CoV-2 RNA, Amplification, Qual Positive (*)     All other components within normal limits   URINALYSIS MICROSCOPIC - Abnormal; Notable for the following components:    Bacteria Few (*)     All other components within normal limits    Narrative:     Preferred Collection Type->Urine, Catheterized   CULTURE, BLOOD   CULTURE, BLOOD   MAGNESIUM   LACTIC ACID, PLASMA          Imaging Results          X-Ray Chest AP Portable (Final result)  Result time 06/02/20 18:10:12    Final result by Vargas Mascorro Jr., MD (06/02/20 18:10:12)                 Impression:      No definite acute findings.      Electronically signed by: Vargas Mascorro Jr., MD  Date:    06/02/2020  Time:    18:10             Narrative:    EXAMINATION:  XR CHEST AP PORTABLE    CLINICAL HISTORY:  Dehydration    COMPARISON:  Prior radiograph from August 2018.    FINDINGS:  The apices are outside of the field of view.  No definite airspace disease.  No pleural fluid or pneumothorax.  Heart size within normal limits.  No significant bony findings.                                 Medical Decision Making:   History:   I obtained history from: someone other than patient.       <> Summary of History: History obtained from the patient's daughter via telephone call.  Old Medical Records: I decided to obtain old medical records.  Old Records Summarized: other records.       <> Summary of Records: Reviewed past medical history, see HPI.  Clinical Tests:   Lab Tests: Ordered and Reviewed    Medical decision making:  This patient underwent evaluation after being found on the ground presumably from a non witnessed fall at the [facility].  The patient has severe dementia and was unable to provide any information pertaining to this visit.  I was able to speak with the patient's daughter.  She reports rapid decline in the patient's status over the past few months with poor  oral intake.  [facility]  I discussed outlook with the patient's daughter as well as treatment options.  She requested that the patient receive IV fluids and be [facility].                   ED Course as of Jun 02 1737 Tue Jun 02, 2020 1734 I just called and spoke with the patient's daughter, Dawna. She reports that her mother has been on a steady decline since March. She has had a 30 lb weight loss over that period of time. There was a discussion about them placing a feeding tube but the family decided that it would not be her wish. The patient's father passed away from Alzheimer's. Dawna reports that they gave her IV fluids within the past week at the assisted home. She agrees with IV fluids here in the ED and discharge back to the [facility].    [LP]      ED Course User Index  [LP] Andre Tomlinson III, MD                Clinical Impression:       ICD-10-CM ICD-9-CM   1. Type 2 diabetes mellitus with hyperglycemia, with long-term current use of insulin E11.65 250.00    Z79.4 790.29     V58.67   2. Dementia with behavioral disturbance, unspecified dementia type F03.91 294.21   3. Malnutrition, unspecified type E46 263.9   4. Acute kidney injury N17.9 584.9   5. COVID-19 virus detected U07.1    6. Dehydration E86.0 276.51         Disposition:   Disposition: Discharged                        Andre Tomlinson III, MD  06/04/20 0542

## 2020-06-02 NOTE — ED NOTES
[facility], patient fell most likely due to the patients low BP. At the [facility], patient BP was in the 80's systolic.  Patient have a hx of dementia. Patient unable to assist with the check in process.

## 2020-06-02 NOTE — ED NOTES
Pt sent from [facility] via private van from the facility and dropped off at the front entrance. Upon arrival py is non-verbal and not able to communicate-the nursing home called report and spoke with one of our nurses and told them that the pt had fallen and had a low pressure and they she is nonverbal all the time-pt is alert but unable to follow commands has a history of alzheimers pt becomes very agitated when any procedures are performed but calm and relaxed when left alone

## 2020-06-03 ENCOUNTER — OUTSIDE PLACE OF SERVICE (OUTPATIENT)
Dept: FAMILY MEDICINE | Facility: CLINIC | Age: 70
End: 2020-06-03
Payer: COMMERCIAL

## 2020-06-03 PROCEDURE — 99499 NO LOS: ICD-10-PCS | Mod: ,,, | Performed by: FAMILY MEDICINE

## 2020-06-03 PROCEDURE — 99499 UNLISTED E&M SERVICE: CPT | Mod: ,,, | Performed by: FAMILY MEDICINE

## 2020-06-03 NOTE — ED NOTES
[facility] NOTIFIED OF PATIETN DISCHARGED STATUS AND REPORT GIVEN TO JESUS UNIT SUPERVISOR. SHE STATED SHE WOULD TRY AND GET A  TO PICK THE PATIENT UP

## 2020-06-03 NOTE — ED NOTES
1-BRACELET  1-WATCH  6-RINGS  ALL PLACED IN ZIPLOCK BAG AND PLACED IN PT BELONGING BAG  3 RINGS LEFT ON PT

## 2020-06-03 NOTE — ED NOTES
ER PHYSICIAN NOTIFIED OF PT BLOOD PRESSURE AND STATED HE WAS OK WITH DISCHARGING THE PATIENT BACK TO THE NURSING HOME

## 2020-06-05 LAB
BACTERIA BLD CULT: ABNORMAL

## 2020-06-07 LAB — BACTERIA BLD CULT: NORMAL

## 2020-08-12 ENCOUNTER — OUTSIDE PLACE OF SERVICE (OUTPATIENT)
Dept: FAMILY MEDICINE | Facility: CLINIC | Age: 70
End: 2020-08-12
Payer: COMMERCIAL

## 2020-08-12 PROCEDURE — 99307 PR NURSING FAC CARE, SUBSEQ, IMPROVING: ICD-10-PCS | Mod: ,,, | Performed by: FAMILY MEDICINE

## 2020-08-12 PROCEDURE — 99307 SBSQ NF CARE SF MDM 10: CPT | Mod: ,,, | Performed by: FAMILY MEDICINE

## 2022-02-16 ENCOUNTER — PATIENT MESSAGE (OUTPATIENT)
Dept: RESEARCH | Facility: HOSPITAL | Age: 72
End: 2022-02-16
Payer: COMMERCIAL